# Patient Record
Sex: MALE | Race: WHITE | Employment: FULL TIME | URBAN - METROPOLITAN AREA
[De-identification: names, ages, dates, MRNs, and addresses within clinical notes are randomized per-mention and may not be internally consistent; named-entity substitution may affect disease eponyms.]

---

## 2018-02-16 ENCOUNTER — HOSPITAL ENCOUNTER (OUTPATIENT)
Dept: LAB | Age: 58
Discharge: HOME | End: 2018-02-16
Payer: COMMERCIAL

## 2018-02-16 ENCOUNTER — HOSPITAL ENCOUNTER (OUTPATIENT)
Dept: CT IMAGING | Age: 58
Discharge: HOME | End: 2018-02-18
Payer: COMMERCIAL

## 2018-02-16 DIAGNOSIS — N20.0 STONE, KIDNEY: ICD-10-CM

## 2018-02-16 DIAGNOSIS — R31.9 HEMATURIA SYNDROME: ICD-10-CM

## 2018-02-16 LAB
BILIRUBIN URINE: NEGATIVE
BLOOD, URINE: NEGATIVE
CLARITY: CLEAR
COLOR: YELLOW
CREAT SERPL-MCNC: 1.13 MG/DL (ref 0.7–1.2)
GFR AFRICAN AMERICAN: >60
GFR NON-AFRICAN AMERICAN: >60
GLUCOSE URINE: NEGATIVE MG/DL
KETONES, URINE: NEGATIVE MG/DL
LEUKOCYTE ESTERASE, URINE: NEGATIVE
NITRITE, URINE: NEGATIVE
PH UA: 7 (ref 5–9)
PROSTATE SPECIFIC ANTIGEN: 0.84 NG/ML (ref 0–3.89)
PROTEIN UA: NEGATIVE MG/DL
SPECIFIC GRAVITY UA: 1.05 (ref 1–1.03)
UROBILINOGEN, URINE: 0.2 E.U./DL

## 2018-02-16 PROCEDURE — 88112 CYTOPATH CELL ENHANCE TECH: CPT

## 2018-02-16 PROCEDURE — 74177 CT ABD & PELVIS W/CONTRAST: CPT

## 2018-02-16 PROCEDURE — 36415 COLL VENOUS BLD VENIPUNCTURE: CPT

## 2018-02-16 PROCEDURE — 82565 ASSAY OF CREATININE: CPT

## 2018-02-16 PROCEDURE — 87086 URINE CULTURE/COLONY COUNT: CPT

## 2018-02-16 PROCEDURE — 81003 URINALYSIS AUTO W/O SCOPE: CPT

## 2018-02-16 PROCEDURE — 6360000004 HC RX CONTRAST MEDICATION: Performed by: FAMILY MEDICINE

## 2018-02-16 PROCEDURE — 84153 ASSAY OF PSA TOTAL: CPT

## 2018-02-16 RX ADMIN — IOPAMIDOL 100 ML: 755 INJECTION, SOLUTION INTRAVENOUS at 08:40

## 2018-02-18 LAB — URINE CULTURE, ROUTINE: NORMAL

## 2023-03-13 ENCOUNTER — TELEMEDICINE (OUTPATIENT)
Dept: PRIMARY CARE | Facility: CLINIC | Age: 63
End: 2023-03-13
Payer: COMMERCIAL

## 2023-03-13 ENCOUNTER — TELEPHONE (OUTPATIENT)
Dept: PRIMARY CARE | Facility: CLINIC | Age: 63
End: 2023-03-13

## 2023-03-13 DIAGNOSIS — J01.10 ACUTE NON-RECURRENT FRONTAL SINUSITIS: Primary | ICD-10-CM

## 2023-03-13 PROCEDURE — 99214 OFFICE O/P EST MOD 30 MIN: CPT | Performed by: FAMILY MEDICINE

## 2023-03-13 RX ORDER — AZITHROMYCIN 250 MG/1
TABLET, FILM COATED ORAL
Qty: 6 TABLET | Refills: 0 | Status: SHIPPED | OUTPATIENT
Start: 2023-03-13

## 2023-03-13 NOTE — PROGRESS NOTES
Pt being seen virtually for sick visit.    Denies N/V/D/C, no S/V, denies rashes/lesions, no CP/SOB. Positive for fevers/chills.  Positive for frontal headache and periorbital pain and pressure.  Also sinus congestion.  All other systems were negative.    Gen: NAD  eyes: EOMI,   ENT: hearing grossly intact, no nasal discharge  resp: breathing comfortably, no SOB noted  derm: no rashes or lesions noted  neuro: CN II-XII grossly intact  psych: A&Ox3, mood pleasant, affect appropriate, recent and remote memory grossly intact.        Acute sinusitis.  Started about a week ago.  Having periorbital and frontal pain and pressure.  Taking Benadryl, ibuprofen, Robitussin, and loratadine. -->>  Continue your oral antihistamine/loratadine, strongly recommend guarding or nasal steroid spray, and a Z-Jaswinder. Could also take sudafed if needed.      return as already scheduled.

## 2023-03-21 ENCOUNTER — OFFICE VISIT (OUTPATIENT)
Dept: PRIMARY CARE | Facility: CLINIC | Age: 63
End: 2023-03-21
Payer: COMMERCIAL

## 2023-03-21 ENCOUNTER — APPOINTMENT (OUTPATIENT)
Dept: PRIMARY CARE | Facility: CLINIC | Age: 63
End: 2023-03-21
Payer: COMMERCIAL

## 2023-03-21 VITALS
DIASTOLIC BLOOD PRESSURE: 82 MMHG | OXYGEN SATURATION: 97 % | SYSTOLIC BLOOD PRESSURE: 113 MMHG | WEIGHT: 168 LBS | BODY MASS INDEX: 25.46 KG/M2 | HEIGHT: 68 IN | HEART RATE: 107 BPM

## 2023-03-21 DIAGNOSIS — J01.90 ACUTE SINUSITIS, RECURRENCE NOT SPECIFIED, UNSPECIFIED LOCATION: Primary | ICD-10-CM

## 2023-03-21 DIAGNOSIS — R05.1 ACUTE COUGH: ICD-10-CM

## 2023-03-21 PROCEDURE — 99214 OFFICE O/P EST MOD 30 MIN: CPT | Performed by: FAMILY MEDICINE

## 2023-03-21 RX ORDER — FENOFIBRATE 200 MG/1
1 CAPSULE ORAL DAILY
COMMUNITY
Start: 2018-08-09 | End: 2023-10-24

## 2023-03-21 RX ORDER — METHYLPREDNISOLONE ACETATE 80 MG/ML
80 INJECTION, SUSPENSION INTRA-ARTICULAR; INTRALESIONAL; INTRAMUSCULAR; SOFT TISSUE ONCE
Status: COMPLETED | OUTPATIENT
Start: 2023-03-21 | End: 2023-03-21

## 2023-03-21 RX ORDER — OMEPRAZOLE 40 MG/1
1 CAPSULE, DELAYED RELEASE ORAL DAILY
COMMUNITY
Start: 2018-08-09 | End: 2023-06-09

## 2023-03-21 RX ORDER — BENZONATATE 200 MG/1
200 CAPSULE ORAL 3 TIMES DAILY PRN
Qty: 21 CAPSULE | Refills: 1 | Status: SHIPPED | OUTPATIENT
Start: 2023-03-21 | End: 2023-04-20

## 2023-03-21 RX ORDER — ICOSAPENT ETHYL 1 G/1
CAPSULE ORAL
COMMUNITY
Start: 2020-11-11 | End: 2023-04-03

## 2023-03-21 RX ORDER — BENZONATATE 200 MG/1
200 CAPSULE ORAL 3 TIMES DAILY PRN
Qty: 21 CAPSULE | Refills: 1 | Status: SHIPPED | OUTPATIENT
Start: 2023-03-21 | End: 2023-03-21 | Stop reason: SDUPTHER

## 2023-03-21 RX ORDER — DOXYCYCLINE 100 MG/1
CAPSULE ORAL
Qty: 20 CAPSULE | Refills: 0 | Status: SHIPPED | OUTPATIENT
Start: 2023-03-21

## 2023-03-21 RX ORDER — POTASSIUM CITRATE 15 MEQ/1
TABLET, EXTENDED RELEASE ORAL
COMMUNITY
Start: 2022-12-29

## 2023-03-21 RX ORDER — ROSUVASTATIN CALCIUM 40 MG/1
1 TABLET, COATED ORAL DAILY
COMMUNITY
Start: 2022-11-25 | End: 2023-12-07 | Stop reason: SDUPTHER

## 2023-03-21 RX ADMIN — METHYLPREDNISOLONE ACETATE 80 MG: 80 INJECTION, SUSPENSION INTRA-ARTICULAR; INTRALESIONAL; INTRAMUSCULAR; SOFT TISSUE at 14:56

## 2023-03-21 NOTE — PROGRESS NOTES
Pt in today for cold/flu symptoms, feverish x 6 days.    Pt being seen virtually for sick visit.     Denies N/V/D/C, no S/V, denies rashes/lesions, no CP/SOB. Positive for fevers/chills.  Positive for frontal headache and periorbital pain and pressure.  Also sinus congestion.  All other systems were negative.     Gen: NAD  eyes: EOMI,   ENT: hearing grossly intact, no nasal discharge  resp: breathing comfortably, no SOB noted  derm: no rashes or lesions noted  neuro: CN II-XII grossly intact  psych: A&Ox3, mood pleasant, affect appropriate, recent and remote memory grossly intact.      acute viral syndrome with acute sinusitis, present chills, scratchy dry cough that gets pretty bad at times.  Was put on a Z-Jaswinder about a week ago and sinus symptoms have improved but still having fevers and chills.  Has been using oral antihistamines/loratadine.  Has nasal steroid spray but has not started using it yet. -->>  Will order chest x-ray.  Will prescribe doxycycline for 10 days.  We will also administer steroid injections.  We will also send in prescription for Tessalon Perles.    We will administer Kenalog injection today.  Return in summertime as already scheduled.

## 2023-04-03 DIAGNOSIS — E78.5 HYPERLIPIDEMIA, UNSPECIFIED HYPERLIPIDEMIA TYPE: Primary | ICD-10-CM

## 2023-04-03 RX ORDER — ICOSAPENT ETHYL 1000 MG/1
CAPSULE ORAL
Qty: 360 CAPSULE | Refills: 1 | Status: SHIPPED | OUTPATIENT
Start: 2023-04-03 | End: 2023-08-09

## 2023-04-27 ENCOUNTER — HOSPITAL ENCOUNTER (OUTPATIENT)
Dept: DATA CONVERSION | Facility: HOSPITAL | Age: 63
End: 2023-04-27
Attending: UROLOGY | Admitting: UROLOGY
Payer: COMMERCIAL

## 2023-04-27 DIAGNOSIS — E78.5 HYPERLIPIDEMIA, UNSPECIFIED: ICD-10-CM

## 2023-04-27 DIAGNOSIS — N35.919 UNSPECIFIED URETHRAL STRICTURE, MALE, UNSPECIFIED SITE: ICD-10-CM

## 2023-04-27 DIAGNOSIS — F17.200 NICOTINE DEPENDENCE, UNSPECIFIED, UNCOMPLICATED: ICD-10-CM

## 2023-04-27 DIAGNOSIS — N20.1 CALCULUS OF URETER: ICD-10-CM

## 2023-04-27 DIAGNOSIS — K21.9 GASTRO-ESOPHAGEAL REFLUX DISEASE WITHOUT ESOPHAGITIS: ICD-10-CM

## 2023-04-27 DIAGNOSIS — Z88.0 ALLERGY STATUS TO PENICILLIN: ICD-10-CM

## 2023-04-27 LAB
ABO GROUP (TYPE) IN BLOOD: NORMAL
ANTIBODY SCREEN: NORMAL
GRAM STAIN: NORMAL
INR IN PPP BY COAGULATION ASSAY: 1 (ref 0.9–1.1)
PROTHROMBIN TIME (PT) IN PPP BY COAGULATION ASSAY: 12 SEC (ref 9.8–13.4)
RH FACTOR: NORMAL
TISSUE/WOUND CULTURE/SMEAR: NORMAL

## 2023-05-02 LAB
CALCULI COMPOSITION: NORMAL
CALCULI DESCRIPTION: NORMAL
CALCULI MASS: 21 MG

## 2023-06-06 DIAGNOSIS — K21.9 GASTRO-ESOPHAGEAL REFLUX DISEASE WITHOUT ESOPHAGITIS: ICD-10-CM

## 2023-06-06 LAB
ALANINE AMINOTRANSFERASE (SGPT) (U/L) IN SER/PLAS: 21 U/L (ref 10–52)
ALBUMIN (G/DL) IN SER/PLAS: 4.7 G/DL (ref 3.4–5)
ALKALINE PHOSPHATASE (U/L) IN SER/PLAS: 75 U/L (ref 33–136)
ANION GAP IN SER/PLAS: 12 MMOL/L (ref 10–20)
APPEARANCE, URINE: CLEAR
ASPARTATE AMINOTRANSFERASE (SGOT) (U/L) IN SER/PLAS: 23 U/L (ref 9–39)
BASOPHILS (10*3/UL) IN BLOOD BY AUTOMATED COUNT: 0.07 X10E9/L (ref 0–0.1)
BASOPHILS/100 LEUKOCYTES IN BLOOD BY AUTOMATED COUNT: 1 % (ref 0–2)
BILIRUBIN TOTAL (MG/DL) IN SER/PLAS: 0.6 MG/DL (ref 0–1.2)
BILIRUBIN, URINE: NEGATIVE
BLOOD, URINE: NEGATIVE
CALCIDIOL (25 OH VITAMIN D3) (NG/ML) IN SER/PLAS: 27 NG/ML
CALCIUM (MG/DL) IN SER/PLAS: 11 MG/DL (ref 8.6–10.3)
CARBON DIOXIDE, TOTAL (MMOL/L) IN SER/PLAS: 27 MMOL/L (ref 21–32)
CHLORIDE (MMOL/L) IN SER/PLAS: 100 MMOL/L (ref 98–107)
CHOLESTEROL (MG/DL) IN SER/PLAS: 188 MG/DL (ref 0–199)
CHOLESTEROL IN HDL (MG/DL) IN SER/PLAS: 39.8 MG/DL
CHOLESTEROL/HDL RATIO: 4.7
COLOR, URINE: YELLOW
CREATININE (MG/DL) IN SER/PLAS: 1.13 MG/DL (ref 0.5–1.3)
EOSINOPHILS (10*3/UL) IN BLOOD BY AUTOMATED COUNT: 0.26 X10E9/L (ref 0–0.7)
EOSINOPHILS/100 LEUKOCYTES IN BLOOD BY AUTOMATED COUNT: 3.6 % (ref 0–6)
ERYTHROCYTE DISTRIBUTION WIDTH (RATIO) BY AUTOMATED COUNT: 15.3 % (ref 11.5–14.5)
ERYTHROCYTE MEAN CORPUSCULAR HEMOGLOBIN CONCENTRATION (G/DL) BY AUTOMATED: 33.3 G/DL (ref 32–36)
ERYTHROCYTE MEAN CORPUSCULAR VOLUME (FL) BY AUTOMATED COUNT: 93 FL (ref 80–100)
ERYTHROCYTES (10*6/UL) IN BLOOD BY AUTOMATED COUNT: 5.1 X10E12/L (ref 4.5–5.9)
ESTIMATED AVERAGE GLUCOSE FOR HBA1C: 117 MG/DL
GFR MALE: 73 ML/MIN/1.73M2
GLUCOSE (MG/DL) IN SER/PLAS: 107 MG/DL (ref 74–99)
GLUCOSE, URINE: NEGATIVE MG/DL
HEMATOCRIT (%) IN BLOOD BY AUTOMATED COUNT: 47.2 % (ref 41–52)
HEMOGLOBIN (G/DL) IN BLOOD: 15.7 G/DL (ref 13.5–17.5)
HEMOGLOBIN A1C/HEMOGLOBIN TOTAL IN BLOOD: 5.7 %
IMMATURE GRANULOCYTES/100 LEUKOCYTES IN BLOOD BY AUTOMATED COUNT: 0.1 % (ref 0–0.9)
KETONES, URINE: NEGATIVE MG/DL
LDL: 112 MG/DL (ref 0–99)
LEUKOCYTE ESTERASE, URINE: NEGATIVE
LEUKOCYTES (10*3/UL) IN BLOOD BY AUTOMATED COUNT: 7.2 X10E9/L (ref 4.4–11.3)
LYMPHOCYTES (10*3/UL) IN BLOOD BY AUTOMATED COUNT: 2.34 X10E9/L (ref 1.2–4.8)
LYMPHOCYTES/100 LEUKOCYTES IN BLOOD BY AUTOMATED COUNT: 32.6 % (ref 13–44)
MAGNESIUM (MG/DL) IN SER/PLAS: 1.68 MG/DL (ref 1.6–2.4)
MONOCYTES (10*3/UL) IN BLOOD BY AUTOMATED COUNT: 0.81 X10E9/L (ref 0.1–1)
MONOCYTES/100 LEUKOCYTES IN BLOOD BY AUTOMATED COUNT: 11.3 % (ref 2–10)
NEUTROPHILS (10*3/UL) IN BLOOD BY AUTOMATED COUNT: 3.69 X10E9/L (ref 1.2–7.7)
NEUTROPHILS/100 LEUKOCYTES IN BLOOD BY AUTOMATED COUNT: 51.4 % (ref 40–80)
NITRITE, URINE: NEGATIVE
PH, URINE: 8 (ref 5–8)
PHOSPHATE (MG/DL) IN SER/PLAS: 2.7 MG/DL (ref 2.5–4.9)
PLATELETS (10*3/UL) IN BLOOD AUTOMATED COUNT: 316 X10E9/L (ref 150–450)
POTASSIUM (MMOL/L) IN SER/PLAS: 4.2 MMOL/L (ref 3.5–5.3)
PROTEIN TOTAL: 7.8 G/DL (ref 6.4–8.2)
PROTEIN, URINE: NEGATIVE MG/DL
SODIUM (MMOL/L) IN SER/PLAS: 135 MMOL/L (ref 136–145)
SPECIFIC GRAVITY, URINE: 1.01 (ref 1–1.03)
THYROTROPIN (MIU/L) IN SER/PLAS BY DETECTION LIMIT <= 0.05 MIU/L: 0.73 MIU/L (ref 0.44–3.98)
TRIGLYCERIDE (MG/DL) IN SER/PLAS: 183 MG/DL (ref 0–149)
UREA NITROGEN (MG/DL) IN SER/PLAS: 17 MG/DL (ref 6–23)
UROBILINOGEN, URINE: <2 MG/DL (ref 0–1.9)
VLDL: 37 MG/DL (ref 0–40)

## 2023-06-09 RX ORDER — OMEPRAZOLE 40 MG/1
CAPSULE, DELAYED RELEASE ORAL
Qty: 90 CAPSULE | Refills: 1 | Status: SHIPPED | OUTPATIENT
Start: 2023-06-09 | End: 2023-09-12 | Stop reason: SDUPTHER

## 2023-07-17 ENCOUNTER — OFFICE VISIT (OUTPATIENT)
Dept: PRIMARY CARE | Facility: CLINIC | Age: 63
End: 2023-07-17
Payer: COMMERCIAL

## 2023-07-17 VITALS
WEIGHT: 165.5 LBS | HEART RATE: 87 BPM | OXYGEN SATURATION: 98 % | SYSTOLIC BLOOD PRESSURE: 159 MMHG | DIASTOLIC BLOOD PRESSURE: 95 MMHG | HEIGHT: 68 IN | BODY MASS INDEX: 25.08 KG/M2

## 2023-07-17 DIAGNOSIS — Z79.899 DRUG THERAPY: ICD-10-CM

## 2023-07-17 DIAGNOSIS — R73.03 PREDIABETES: ICD-10-CM

## 2023-07-17 DIAGNOSIS — B00.9 HERPES SIMPLEX: ICD-10-CM

## 2023-07-17 DIAGNOSIS — Z00.00 PREVENTATIVE HEALTH CARE: Primary | ICD-10-CM

## 2023-07-17 DIAGNOSIS — Z13.9 SCREENING FOR CONDITION: ICD-10-CM

## 2023-07-17 DIAGNOSIS — M81.0 OSTEOPOROSIS, UNSPECIFIED OSTEOPOROSIS TYPE, UNSPECIFIED PATHOLOGICAL FRACTURE PRESENCE: ICD-10-CM

## 2023-07-17 DIAGNOSIS — S22.000A COMPRESSION FRACTURE OF BODY OF THORACIC VERTEBRA (MULTI): ICD-10-CM

## 2023-07-17 DIAGNOSIS — E78.01 FAMILIAL HYPERCHOLESTEROLEMIA: ICD-10-CM

## 2023-07-17 DIAGNOSIS — E55.9 VITAMIN D DEFICIENCY: ICD-10-CM

## 2023-07-17 PROCEDURE — 4004F PT TOBACCO SCREEN RCVD TLK: CPT | Performed by: FAMILY MEDICINE

## 2023-07-17 PROCEDURE — 99396 PREV VISIT EST AGE 40-64: CPT | Performed by: FAMILY MEDICINE

## 2023-07-17 PROCEDURE — 99215 OFFICE O/P EST HI 40 MIN: CPT | Performed by: FAMILY MEDICINE

## 2023-07-17 NOTE — PROGRESS NOTES
Subjective   Patient ID: Usman Villarreal is a 63 y.o. male who presents for Routine FU / Lab Review (Pt in today for routine FU / lab review).    Review of Systems  Denies N/V/D/C, no HA/S/V, denies rashes/lesions, no CP/SOB. Denies fevers/chills.  Positive for back pain.  All other systems were negative.    Objective   Physical Exam  Gen: NAD  eyes: EOMI, PERRLA  ENT: hearing grossly intact, no nasal discharge  resp: CTABL, without R/R  heart: RRR without MRG  GI: abd: S/ND/NT, BS+  lymph: no axillary, cervical, supraclavicular lymphadenopathy noted   MS: gait grossly WNL,  derm: no rashes or lesions noted  neuro: CN II-XII grossly intact  psych: A&Ox3    Assessment/Plan   Diagnoses and all orders for this visit:  Preventative health care  -     CBC and Auto Differential; Future  -     Comprehensive Metabolic Panel; Future  -     TSH with reflex to Free T4 if abnormal; Future  -     Magnesium; Future  -     Lipid Panel; Future  -     Hemoglobin A1C; Future  -     Urinalysis with Reflex Microscopic; Future  -     Vitamin D 1,25 Dihydroxy; Future  Drug therapy  -     CBC and Auto Differential; Future  -     Comprehensive Metabolic Panel; Future  -     Magnesium; Future  -     Urinalysis with Reflex Microscopic; Future  Screening for condition  -     TSH with reflex to Free T4 if abnormal; Future  -     Lipid Panel; Future  -     Hemoglobin A1C; Future  Vitamin D deficiency  -     Vitamin D 1,25 Dihydroxy; Future  Familial hypercholesterolemia  -     Lipid Panel; Future  Herpes simplex  Osteoporosis, unspecified osteoporosis type, unspecified pathological fracture presence  Compression fracture of body of thoracic vertebra (CMS/HCC)  Prediabetes  -     Hemoglobin A1C; Future           Patient Discussion/Summary     annual wellness visit     -----  The next flu shot will be due next month.   Is up to date on shingles immunizations.   Up-to-date on COVID immunization series, but is due for bivalent booster.   Sounds like  patient is due for tetanus. -->> Recommend checking with your drugstore to get up-to-date on immunizations.      Screen for hepatitis C was negative november 2018.   Screening for colon cancer, prostate cancer -->> Sounds like most recent colonoscopy was around 2016, sounds like no polyps or other problem so probably due in 2026 for next. PSAs are being screened with urology.   -----    Cardiac CT calcium score was 213. -->> We will aggressively manage risk factors including blood pressure and cholesterol so that LDL is below 70 will be our goal.     BMI 25, down another 3 pounds since last appointment here.     New annual labs reviewed:  - Drug Therapy, -->> Plan to repeat annual labs around June 2024.   - Hyperlipidemia, familial hypercholesterolemia (had  in 2016), HDL 40, was 33, 34, 37, 36, 34, 40, 42, 45, goal is 45 or more. , was 88, 120, 120, 236, 136, 124, 130, 147, your goal is now 70 or less, so that is much improved but still a little too high. Patient is on Crestor 40 mg, fenofibrate 200 mg, Vascepa, and flush free niacin. We discussed possibly starting an injectable, for example Repatha, that once or twice a month. Patient declines at this time, wants to work on getting his back taken care of. -->>  We will discuss possible injections next time.  - Prediabetes, A1C 5.7, was 5.7, 5.4, 5.8, 5.5, 5.8, 5.6. Patient has improved diet. -->> Continue to watch your carbohydrate and sugar intake. We'll recheck with her next annual labs.  - Vitamin D deficiency, 27, was 34, 27, 30, 36, 30, 26, 28, 26, goal is 55 - 100. Patient is no longer on vitamin D, discontinued due to concern may be causing kidney stones. -->> Will recheck with next annual labs.  Definitely discussed vitamin D with endocrinology, because vitamin D conceivably could lower your calcium by putting more into your bones.    Osteoporosis, new diagnosis.  Having noted compression fractures T8 and 9, at different time intervals  apparently.  Has appointment scheduled to see endocrinology, Dr. Rodriguez at [facility]. -->> f/up as planned.     Herpes simplex, gets cold sores rarely, says the acyclovir works very well when he takes it at first symptom of a cold sore. -->> Refill acyclovir as needed.     Smokes about a pack a day. -->> Advised to quit.    Kidney stones, had passed another one a while ago.  Doing well at this time.  Seeing urology, had another lithotripsy recently.      - Patient will return around July 2024 for annual preventative visit and lab review.   - We will give patient lab slips to get our labs done with kidney labs next June.

## 2023-07-17 NOTE — PATIENT INSTRUCTIONS
Patient Discussion/Summary     annual wellness visit     -----  The next flu shot will be due next month.   Is up to date on shingles immunizations.   Up-to-date on COVID immunization series, but is due for bivalent booster.   Sounds like patient is due for tetanus. -->> Recommend checking with your drugstore to get up-to-date on immunizations.      Screen for hepatitis C was negative november 2018.   Screening for colon cancer, prostate cancer -->> Sounds like most recent colonoscopy was around 2016, sounds like no polyps or other problem so probably due in 2026 for next. PSAs are being screened with urology.   -----    Cardiac CT calcium score was 213. -->> We will aggressively manage risk factors including blood pressure and cholesterol so that LDL is below 70 will be our goal.     BMI 25, down another 3 pounds since last appointment here.     New annual labs reviewed:  - Drug Therapy, -->> Plan to repeat annual labs around June 2024.   - Hyperlipidemia, familial hypercholesterolemia (had  in 2016), HDL 40, was 33, 34, 37, 36, 34, 40, 42, 45, goal is 45 or more. , was 88, 120, 120, 236, 136, 124, 130, 147, your goal is now 70 or less, so that is much improved but still a little too high. Patient is on Crestor 40 mg, fenofibrate 200 mg, Vascepa, and flush free niacin. We discussed possibly starting an injectable, for example Repatha, that once or twice a month. Patient declines at this time, wants to work on getting his back taken care of. -->>  We will discuss possible injections next time.  - Prediabetes, A1C 5.7, was 5.7, 5.4, 5.8, 5.5, 5.8, 5.6. Patient has improved diet. -->> Continue to watch your carbohydrate and sugar intake. We'll recheck with her next annual labs.  - Vitamin D deficiency, 27, was 34, 27, 30, 36, 30, 26, 28, 26, goal is 55 - 100. Patient is no longer on vitamin D, discontinued due to concern may be causing kidney stones. -->> Will recheck with next annual labs.  Definitely  discussed vitamin D with endocrinology, because vitamin D conceivably could lower your calcium by putting more into your bones.    Osteoporosis, new diagnosis.  Having noted compression fractures T8 and 9, at different time intervals apparently.  Has appointment scheduled to see endocrinology, Dr. Rodriguez at [facility]. -->> f/up as planned.     Herpes simplex, gets cold sores rarely, says the acyclovir works very well when he takes it at first symptom of a cold sore. -->> Refill acyclovir as needed.     Smokes about a pack a day. -->> Advised to quit.    Kidney stones, had passed another one a while ago.  Doing well at this time.  Seeing urology, had another lithotripsy recently.      - Patient will return around July 2024 for annual preventative visit and lab review.   - We will give patient lab slips to get our labs done with kidney labs next June.

## 2023-08-07 DIAGNOSIS — E78.5 HYPERLIPIDEMIA, UNSPECIFIED HYPERLIPIDEMIA TYPE: ICD-10-CM

## 2023-08-09 RX ORDER — ICOSAPENT ETHYL 1000 MG/1
CAPSULE ORAL
Qty: 360 CAPSULE | Refills: 1 | Status: SHIPPED | OUTPATIENT
Start: 2023-08-09 | End: 2024-05-08 | Stop reason: SDUPTHER

## 2023-09-07 VITALS — WEIGHT: 167.55 LBS | BODY MASS INDEX: 25.39 KG/M2 | HEIGHT: 68 IN

## 2023-09-12 DIAGNOSIS — K21.9 GASTRO-ESOPHAGEAL REFLUX DISEASE WITHOUT ESOPHAGITIS: ICD-10-CM

## 2023-09-12 LAB
ALBUMIN (G/DL) IN SER/PLAS: 4.7 G/DL (ref 3.4–5)
ANION GAP IN SER/PLAS: 15 MMOL/L (ref 10–20)
CALCIDIOL (25 OH VITAMIN D3) (NG/ML) IN SER/PLAS: 28 NG/ML
CALCIUM (MG/DL) IN SER/PLAS: 10.8 MG/DL (ref 8.6–10.3)
CARBON DIOXIDE, TOTAL (MMOL/L) IN SER/PLAS: 27 MMOL/L (ref 21–32)
CHLORIDE (MMOL/L) IN SER/PLAS: 102 MMOL/L (ref 98–107)
CREATININE (MG/DL) IN SER/PLAS: 1.2 MG/DL (ref 0.5–1.3)
GFR MALE: 68 ML/MIN/1.73M2
GLUCOSE (MG/DL) IN SER/PLAS: 99 MG/DL (ref 74–99)
MAGNESIUM (MG/DL) IN SER/PLAS: 1.75 MG/DL (ref 1.6–2.4)
PARATHYRIN INTACT (PG/ML) IN SER/PLAS: 34.5 PG/ML (ref 18.5–88)
PHOSPHATE (MG/DL) IN SER/PLAS: 2.9 MG/DL (ref 2.5–4.9)
POTASSIUM (MMOL/L) IN SER/PLAS: 4.5 MMOL/L (ref 3.5–5.3)
SODIUM (MMOL/L) IN SER/PLAS: 139 MMOL/L (ref 136–145)
THYROTROPIN (MIU/L) IN SER/PLAS BY DETECTION LIMIT <= 0.05 MIU/L: 1.03 MIU/L (ref 0.44–3.98)
UREA NITROGEN (MG/DL) IN SER/PLAS: 19 MG/DL (ref 6–23)

## 2023-09-12 RX ORDER — OMEPRAZOLE 40 MG/1
40 CAPSULE, DELAYED RELEASE ORAL DAILY
Qty: 90 CAPSULE | Refills: 3 | Status: SHIPPED | OUTPATIENT
Start: 2023-09-12

## 2023-09-14 NOTE — H&P
History & Physical Reviewed:   I have reviewed the History and Physical dated:  24-Apr-2023   History and Physical reviewed and relevant findings noted. Patient examined to review pertinent physical  findings.: No significant changes   Home Medications Reviewed: no changes noted   Allergies Reviewed: no changes noted     Consent:   COVID-19 Consent:  ·  COVID-19 Risk Consent Surgeon has reviewed key risks related to the risk of kaushal COVID-19 and if they contract COVID-19 what the risks are.       Electronic Signatures:  Khanh Hogan)  (Signed 27-Apr-2023 13:12)   Authored: History & Physical Reviewed, Consent, Note  Completion      Last Updated: 27-Apr-2023 13:12 by Khanh Hogan)

## 2023-09-15 LAB — SEX HORMONE BINDING GLOBULIN (NMOL/L) IN SER/PLAS: 45.8 NMOL/L (ref 13.3–89.5)

## 2023-09-18 LAB
TESTOSTERONE FREE (CHAN): 70 PG/ML (ref 35–155)
TESTOSTERONE,TOTAL,LC-MS/MS: 486 NG/DL (ref 250–1100)

## 2023-10-02 NOTE — OP NOTE
Post Operative Note:     PreOp Diagnosis: Urethral stricture, ureteral calculus,  hydronephrosis, flank pain April 27, 2023   Post-Procedure Diagnosis: Same   Procedure: 1.  Cystoscopy with urethral dilatation  2.  Right retropyelogram  3.  Right ureteroscopy with removal of 4 separate 4 mm proximal ureteral calculi  4.   5.   Surgeon: Dr. Khanh Hogan   Resident/Fellow/Other Assistant: None   Anesthesia: General/Dr. Cornelio Pardo   I.V. Fluids: 1000 cc   Estimated Blood Loss (mL): Minimal   Blood Replacement: None   Specimen: yes. Right ureteral calculi   Complications: None   Findings: 4 separate right proximal ureteral calculi  measuring 4 mm each   Patient Returned To/Condition: PACU/stable   Urine Output: Lost to outflow   Drains and/or Catheters: None   Implants: None     Operative Report Dictated:  Dictation: not applicable - note contains Operative  Report   Note Recipients: Jamie Waller MD - [phone]  []  Khanh Hogan MD - [phone] [preferred]   Operative Report:    INDICATIONS FOR SURGERY:  63-year-old white male with a long history of stone disease.  Now with a 4 mm right proximal stone and a 5 mm right UVJ stone.  He has been to the emergency room on 2 separate occasions recently.  210 CAT scans have been identified related to those stones.   Benefits, risks, potential complications, and all therapeutic options have been described to the patient. All questions have been answered satisfactorily and the patient has elected to undergo the above named procedure.    DETAILS OF OPERATION:  The patient was brought to the operating room suite and identified by wrist band. The patient received ciprofloxacin 400 mg intravenously as a prophylactic antibiotic. The patient underwent general anesthesia under the direction of Dr. Cornelio Pardo. Once  adequate anesthesia was achieved, the patient was placed in the dorsolithotomy position. A timeout was performed in accordance with the Joint  Commission. This procedure was performed with the use of videoscopic vision and x-ray.    The #22 Qatari rigid cystoscope could not be inserted due to a severe distal urethral stricture within 2 cm of the meatus.  Therefore, with the use of the male sounds, the stricture was dilated to 26 Qatari with bleeding.  The cystoscope was then inserted.   Once inside the bladder, both ureteral orifices were identified.  A full examination did not identify any evidence of stones, lesions or tumors.    A 6 Qatari open-ended ureteral catheter was cannulated into the right ureteral orifice and a retropyelogram was performed.  This identified filling defects in the proximal ureter.  The cystoscope and ureteral catheter were removed.    The 8 Qatari semirigid ureteroscope was then introduced.  With the use of a 0.035 inch guidewire, the right ureter was cannulated.  A total of 4 separate 4 mm stones were encountered in the right proximal ureter.  Guidewire was advanced beyond the stones  to the renal pelvis.  The ureteroscope was removed.  Ureteroscope was reinserted alongside the guidewire.  With the use of the grasping forceps, each of the stones was engaged separately and removed separately.  They were sent collectively for metabolic  analysis.  The ureteroscope was then reintroduced and the entire length of the ureter to the renal pelvis was visualized.  No further stones or fragments were seen.  The ureteroscope and guidewire were removed.    The patient tolerated the procedure well and was transferred to the post anesthesia care unit alert, awake, in good condition. We will await metabolic analysis of the stones and postoperative imaging studies.      Khanh Hogan M.D.    *** This report was created with the use of a voice recognition software system. Unintended typographical or grammatical errors may have occurred. ***      Attestation:   Note Completion:  Attending Attestation I performed the procedure without a  resident         Electronic Signatures:  Khanh Hogan)  (Signed 30-Apr-2023 20:44)   Authored: Post Operative Note, Note Completion      Last Updated: 30-Apr-2023 20:44 by Khanh Hogan)

## 2023-10-21 DIAGNOSIS — R07.81 PLEURODYNIA: ICD-10-CM

## 2023-10-24 RX ORDER — FENOFIBRATE 200 MG/1
200 CAPSULE ORAL DAILY
Qty: 90 CAPSULE | Refills: 3 | Status: SHIPPED | OUTPATIENT
Start: 2023-10-24

## 2023-12-07 DIAGNOSIS — E78.01 FAMILIAL HYPERCHOLESTEROLEMIA: Primary | ICD-10-CM

## 2023-12-07 RX ORDER — ROSUVASTATIN CALCIUM 40 MG/1
TABLET, COATED ORAL
Qty: 90 TABLET | Refills: 3 | Status: SHIPPED | OUTPATIENT
Start: 2023-12-07

## 2023-12-07 RX ORDER — ACYCLOVIR 400 MG/1
400 TABLET ORAL 3 TIMES DAILY PRN
COMMUNITY
End: 2023-12-08

## 2023-12-08 DIAGNOSIS — B00.9 HERPES SIMPLEX: Primary | ICD-10-CM

## 2023-12-08 RX ORDER — ACYCLOVIR 400 MG/1
TABLET ORAL
Qty: 30 TABLET | Refills: 5 | Status: SHIPPED | OUTPATIENT
Start: 2023-12-08

## 2023-12-09 DIAGNOSIS — E78.01 FAMILIAL HYPERCHOLESTEROLEMIA: ICD-10-CM

## 2023-12-11 RX ORDER — ROSUVASTATIN CALCIUM 40 MG/1
TABLET, COATED ORAL
Qty: 90 TABLET | Refills: 3 | OUTPATIENT
Start: 2023-12-11

## 2024-01-23 ENCOUNTER — LAB (OUTPATIENT)
Dept: LAB | Facility: LAB | Age: 64
End: 2024-01-23
Payer: COMMERCIAL

## 2024-01-23 DIAGNOSIS — R31.9 HEMATURIA, UNSPECIFIED: ICD-10-CM

## 2024-01-23 DIAGNOSIS — E83.52 HYPERCALCEMIA: ICD-10-CM

## 2024-01-23 DIAGNOSIS — M81.0 AGE-RELATED OSTEOPOROSIS WITHOUT CURRENT PATHOLOGICAL FRACTURE: ICD-10-CM

## 2024-01-23 DIAGNOSIS — R82.994 HYPERCALCIURIA: ICD-10-CM

## 2024-01-23 DIAGNOSIS — N20.1 CALCULUS OF URETER: ICD-10-CM

## 2024-01-23 DIAGNOSIS — E55.9 VITAMIN D DEFICIENCY, UNSPECIFIED: ICD-10-CM

## 2024-01-23 LAB
25(OH)D3 SERPL-MCNC: 26 NG/ML (ref 30–100)
ALBUMIN SERPL BCP-MCNC: 4.9 G/DL (ref 3.4–5)
ANION GAP SERPL CALC-SCNC: 15 MMOL/L (ref 10–20)
APPEARANCE UR: CLEAR
BILIRUB UR STRIP.AUTO-MCNC: NEGATIVE MG/DL
BUN SERPL-MCNC: 17 MG/DL (ref 6–23)
CALCIUM SERPL-MCNC: 10.6 MG/DL (ref 8.6–10.3)
CHLORIDE SERPL-SCNC: 100 MMOL/L (ref 98–107)
CO2 SERPL-SCNC: 27 MMOL/L (ref 21–32)
COLOR UR: NORMAL
CREAT SERPL-MCNC: 1.21 MG/DL (ref 0.5–1.3)
EGFRCR SERPLBLD CKD-EPI 2021: 68 ML/MIN/1.73M*2
GLUCOSE SERPL-MCNC: 88 MG/DL (ref 74–99)
GLUCOSE UR STRIP.AUTO-MCNC: NEGATIVE MG/DL
KETONES UR STRIP.AUTO-MCNC: NEGATIVE MG/DL
LEUKOCYTE ESTERASE UR QL STRIP.AUTO: NEGATIVE
MAGNESIUM SERPL-MCNC: 1.9 MG/DL (ref 1.6–2.4)
NITRITE UR QL STRIP.AUTO: NEGATIVE
PH UR STRIP.AUTO: 8 [PH]
PHOSPHATE SERPL-MCNC: 2.3 MG/DL (ref 2.5–4.9)
POTASSIUM SERPL-SCNC: 5.1 MMOL/L (ref 3.5–5.3)
PROT UR STRIP.AUTO-MCNC: NEGATIVE MG/DL
PSA SERPL-MCNC: 1.88 NG/ML
PTH-INTACT SERPL-MCNC: 36.8 PG/ML (ref 18.5–88)
RBC # UR STRIP.AUTO: NEGATIVE /UL
SODIUM SERPL-SCNC: 137 MMOL/L (ref 136–145)
SP GR UR STRIP.AUTO: 1
TSH SERPL-ACNC: 1 MIU/L (ref 0.44–3.98)
UROBILINOGEN UR STRIP.AUTO-MCNC: <2 MG/DL

## 2024-01-23 PROCEDURE — 84153 ASSAY OF PSA TOTAL: CPT

## 2024-01-23 PROCEDURE — 83970 ASSAY OF PARATHORMONE: CPT

## 2024-01-23 PROCEDURE — 84443 ASSAY THYROID STIM HORMONE: CPT

## 2024-01-23 PROCEDURE — 83735 ASSAY OF MAGNESIUM: CPT

## 2024-01-23 PROCEDURE — 80069 RENAL FUNCTION PANEL: CPT

## 2024-01-23 PROCEDURE — 36415 COLL VENOUS BLD VENIPUNCTURE: CPT

## 2024-01-23 PROCEDURE — 82306 VITAMIN D 25 HYDROXY: CPT

## 2024-01-23 PROCEDURE — 87086 URINE CULTURE/COLONY COUNT: CPT

## 2024-01-23 PROCEDURE — 81003 URINALYSIS AUTO W/O SCOPE: CPT

## 2024-01-24 LAB — BACTERIA UR CULT: NO GROWTH

## 2024-03-04 ENCOUNTER — OFFICE VISIT (OUTPATIENT)
Dept: UROLOGY | Facility: CLINIC | Age: 64
End: 2024-03-04
Payer: COMMERCIAL

## 2024-03-04 VITALS
WEIGHT: 169.31 LBS | DIASTOLIC BLOOD PRESSURE: 95 MMHG | HEIGHT: 68 IN | RESPIRATION RATE: 16 BRPM | BODY MASS INDEX: 25.66 KG/M2 | HEART RATE: 101 BPM | SYSTOLIC BLOOD PRESSURE: 144 MMHG

## 2024-03-04 DIAGNOSIS — N20.0 NEPHROLITHIASIS: Primary | ICD-10-CM

## 2024-03-04 DIAGNOSIS — R82.89 ABNORMAL URINE CYTOLOGY: ICD-10-CM

## 2024-03-04 DIAGNOSIS — N20.0 NEPHROLITHIASIS: ICD-10-CM

## 2024-03-04 PROCEDURE — 99214 OFFICE O/P EST MOD 30 MIN: CPT | Performed by: UROLOGY

## 2024-03-04 ASSESSMENT — ENCOUNTER SYMPTOMS
DIFFICULTY URINATING: 0
DYSURIA: 0
FREQUENCY: 0
HEMATURIA: 0

## 2024-03-04 ASSESSMENT — PAIN SCALES - GENERAL: PAINLEVEL: 2

## 2024-03-04 NOTE — PROGRESS NOTES
Patient has lower back pain. Patient had lithotripsy with Dr. Hogan on 4/27/23. Patient denies any concerns about falling or safety. Patient has no new urinary issues, has nocturia x2 but not a concern at the moment. CV     Review of Systems   Genitourinary:  Negative for decreased urine volume, difficulty urinating, dysuria, enuresis, frequency, hematuria and urgency.   All other systems reviewed and are negative.

## 2024-03-04 NOTE — LETTER
"March 4, 2024     Jamie Waller DO  [address] [facility], [address]  [facility] [address]    Patient: Usman Villarreal   YOB: 1960   Date of Visit: 3/4/2024       Dear Dr. Jamie Waller DO:    Thank you for referring Usman Villarreal to me for evaluation. Below are my notes for this consultation.  If you have questions, please do not hesitate to call me. I look forward to following your patient along with you.       Sincerely,     Khanh Hogan MD      CC: No Recipients  ______________________________________________________________________________________        Provider Impressions     64 year-old white male referred by Dr. Waller for BACK PAIN, HEMATURIA, bilateral KIDNEY STONES. Patient is a  by occupation. He has a positive family history of breast cancer. He has a 40-pack-year cigarette smoking history and now smokes one pack of cigarettes per day.     04/14/18, urinalysis, urine culture and urine cytology are negative. PSA 0.84. Several CAT scans and a CT urogram demonstrated an 8 mm left RENAL CALCULUS and a 4 mm right RENAL CALCULUS. Benefits benefits and risks described. Patient will undergo ESWL of his left RENAL CALCULUS on 05/17/18. The CAT scans have shown CHRONIC PANCREATITIS also. He has seen Dr. Andrea in consultation.     05/17/18, OR, CYSTOSCOPY, left RETROPYELOGRAM, placement of left 6 English by 26 cm double-J URETERAL STENT, ESWL of a 0.7 cm left lower pole STONE.     05/19/18, renal colic CAT scan identifies \"small\" left STONES no sizes were given.     05/23/18, KUB reveals tiny left RENAL STONE remaining.     05/26/18, in office CYSTOSCOPY with REMOVAL of left URETERAL STENT     03/12/19, patient returns with no complaints. Urine culture is normal with no growth. Parathyroid hormone normal at 40.9. PSA normal at 1.4. Urinalysis is negative. Creatinine 1.26. 24-hour urine shows HIGH OXALATE. Renal colic CAT scan does show a " 5 mm STONE in the right kidney. URINE CYTOLOGY IS ATYPICAL WITH PAPILLARY CLUSTERS. He does have a significant cigarette smoking history. I offered the patient a cystoscopy under local anesthesia for evaluation of his abnormal cytology. He ABSOLUTELY REFUSES ANY LOCAL PROCEDURES. He also ABSOLUTELY REFUSES ANY URETERAL STENTS. Therefore, he will return in 1 month with an IVP to determine whether to proceed with ESWL. If he does have a large stone, we would perform ESWL, cystoscopy and possible TURBT. If his stone is less than 5 mm, then he will undergo a cystoscopy with possible TURBT in the operating room.     04/26/19, patient continues to have no complaints. IVP was performed which identified a 4 mm lower pole right RENAL CALCULUS which we will continue to observe. However with attention to his ABNORMAL CYTOLOGY, the patient is now willing to undergo a cystoscopy in the office under local anesthesia.     05/01/19, cystoscopy today does not reveal any evidence of tumors or stones within the bladder. The patient will return in March of next year for his yearly evaluation.     03/06/20, patient arrives alone. He has no new complaints. Urinalysis and urine culture negative. Urine cytology once again shows atypical cells and cannot rule out urothelial neoplasm. We will proceed with a cystoscopy. This is a second year and a row in this 40-pack-year cigarette smoker and pack-a-day smoker. If the cystoscopy is negative and another abnormal cytology occurs, I will send him to a different urologist for further evaluation. Renal ultrasound shows no stones bilaterally. PSA is normal at 1.00. He will return for cystoscopy     06/16/20, cystoscopy today does not reveal any evidence of stones, lesions or tumors. In light of 2 questionable urine cytologies and a daily cigarette smoker, I will repeat the cytology in 3 months. If it is still suspicious for urothelial neoplasm, I will refer him to Dr. Jenkins for further  "evaluation and treatment as necessary. He would most probably require ureteroscopy at that point.     09/04/20, patient arrives alone. His repeat urine cytology is negative for malignant cells. We will continue to follow. He will return in March.     February 22, 2021, patient arrives alone. He is extremely anxious. A renal ultrasound showed bilateral 8 mm stones, the one on the right side was in the UPJ. This was confirmed with a CT urogram showing a 6 mm stone just distal to the right UPJ. No left stone was seen. Urine tests were all normal including urinalysis, urine culture and cytology. PSA is normal at 1.50 creatinine 1.30. Patient has absolutely no pain at this time. I have scheduled him for ESWL of his right proximal ureteral calculus. He absolutely refuses ureteral stents due to his last experience. He has been reading extensively online and wishes to have ureteroscopy with laser lithotripsy performed by Dr. Lay. I have absolutely no problem canceling his surgery and scheduling him with Dr. Lay which we will do. He may choose to have all follow-up with Dr. Lay or return to me next year. Which ever is in his best interest. He is very very anxious about having ESWL again and experiencing discomfort.     March 9, 2021 OR, Dr. Lay, ESWL and right retrograde, ureteroscopy and basket extraction of stones.     October 6, 2021, Dr. Lay, office, follow-up visit.     March 15, 2022, patient arrives alone. He is extremely anxious and worried. Urinalysis is negative for blood. Urine culture no growth. Urine cytology again is atypical cannot rule out neoplasm. I have offered him a cystoscopy in the office. PSA is normal at 1.50. Renal ultrasound shows only a 4 mm stone in the right kidney. His last experience with me and with Dr. Lay with the ureteral stent was \"the worst experience of my life\". He is petrified that the for 4 mm stone will grow and that he will need a procedure with a stent. He is " "strongly asking for ESWL of a 4 mm stone without a stent and at the same time evaluating his bladder for his abnormal cytology. Before I would proceed, I would like a renal colic CAT scan and KUB with upright. He will return in 4 weeks.     April 11, 2022, patient arrives alone. Renal colic CAT scan identifies a 3 mm stone at the right UPJ. Also sigmoid colon thickening recommending a colonoscopy. We will contact Dr. Ramírez Smallwood for evaluation, gastroenterologist. We will schedule the patient for ESWL of the stone and also possible TURBT due to his abnormal cytology. He is extremely anxious. He is scheduled for May 12, 2022 and Dr. Jamie Perdue will provide preoperative medical risk stratification.     May 12, 2022, OR, cystoscopy with urethral dilatation, right ureteroscopy, ESWL of a 4 mm right UPJ stone.     May 18, 2023, postoperative KUB, no stone seen.     March 7, 2023, patient arrives alone. He is now on complete disability. He is extremely anxious about all of his medical conditions. He no longer has any flank pain. PSA is normal at 1.54. Urinalysis is negative for blood. Urine culture shows no growth. Urine cytology is lacking atypia. Renal ultrasound shows an 8 mm simple right renal cyst and a \"punctate\" stone in the left renal sinus fat. He will return in 1 year.    April 27, 2023, OR, cystoscopy with urethral dilatation, right retropyelogram, right ureteroscopy with removal of 4 separate 4 mm proximal ureteral calculi    CALCIUM PHOSPHATE stones    May 3, 2023, postoperative KUB, no stone seen.    March 4, 2024, BIRTHDAY BOY.  Patient arrives alone.  He still has issues with back pain.  He is scheduled for parathyroid surgery next month.  Urine culture no growth.  Urinalysis is negative for blood.  PSA 1.88.  Renal ultrasound shows 2 mm stones in the right kidney and 3 mm stones in the left kidney.  We will continue to follow.  He will return in 1 year.     PLAN:     #1 in March 2025 with urine " studies, PSA, and a renal ultrasound .     #2 patient had a horrible urgency and frequency with his stent. Also distal penile pain. He absolutely REFUSES a URETERAL STENT    Physical Exam  Vitals and nursing note reviewed.   Constitutional:       Appearance: Normal appearance.   HENT:      Head: Normocephalic and atraumatic.   Pulmonary:      Effort: Pulmonary effort is normal.   Abdominal:      Palpations: Abdomen is soft.   Musculoskeletal:         General: Normal range of motion.      Cervical back: Normal range of motion and neck supple.   Neurological:      Mental Status: He is alert and oriented to person, place, and time. Mental status is at baseline.   Psychiatric:         Thought Content: Thought content normal.         This note was created with voice-recognition software and was not corrected for typographical or grammatical errors.

## 2024-03-04 NOTE — PATIENT INSTRUCTIONS
Patient Discussion/Summary     It was nice to see you once again. I am happy to hear that you are scheduled for parathyroid surgery next month. Your urinalysis shows no blood, urine culture no growth. Your PSA is normal at 1.88. Your ultrasound shows an 2 mm stones in the right kidney and 3 mm stones in the left kidney. No treatment required. I will see you again in 1 year.  HAPPY BIRTHDAY.      This note was created with voice-recognition software and was not corrected for typographical or grammatical errors.

## 2024-03-04 NOTE — PROGRESS NOTES
"    Provider Impressions     64 year-old white male referred by Dr. Perdue for BACK PAIN, HEMATURIA, bilateral KIDNEY STONES. Patient is a  by occupation. He has a positive family history of breast cancer. He has a 40-pack-year cigarette smoking history and now smokes one pack of cigarettes per day.     04/14/18, urinalysis, urine culture and urine cytology are negative. PSA 0.84. Several CAT scans and a CT urogram demonstrated an 8 mm left RENAL CALCULUS and a 4 mm right RENAL CALCULUS. Benefits benefits and risks described. Patient will undergo ESWL of his left RENAL CALCULUS on 05/17/18. The CAT scans have shown CHRONIC PANCREATITIS also. He has seen Dr. Andrea in consultation.     05/17/18, OR, CYSTOSCOPY, left RETROPYELOGRAM, placement of left 6 Amharic by 26 cm double-J URETERAL STENT, ESWL of a 0.7 cm left lower pole STONE.     05/19/18, renal colic CAT scan identifies \"small\" left STONES no sizes were given.     05/23/18, KUB reveals tiny left RENAL STONE remaining.     05/26/18, in office CYSTOSCOPY with REMOVAL of left URETERAL STENT     03/12/19, patient returns with no complaints. Urine culture is normal with no growth. Parathyroid hormone normal at 40.9. PSA normal at 1.4. Urinalysis is negative. Creatinine 1.26. 24-hour urine shows HIGH OXALATE. Renal colic CAT scan does show a 5 mm STONE in the right kidney. URINE CYTOLOGY IS ATYPICAL WITH PAPILLARY CLUSTERS. He does have a significant cigarette smoking history. I offered the patient a cystoscopy under local anesthesia for evaluation of his abnormal cytology. He ABSOLUTELY REFUSES ANY LOCAL PROCEDURES. He also ABSOLUTELY REFUSES ANY URETERAL STENTS. Therefore, he will return in 1 month with an IVP to determine whether to proceed with ESWL. If he does have a large stone, we would perform ESWL, cystoscopy and possible TURBT. If his stone is less than 5 mm, then he will undergo a cystoscopy with possible TURBT in the operating room.   "   04/26/19, patient continues to have no complaints. IVP was performed which identified a 4 mm lower pole right RENAL CALCULUS which we will continue to observe. However with attention to his ABNORMAL CYTOLOGY, the patient is now willing to undergo a cystoscopy in the office under local anesthesia.     05/01/19, cystoscopy today does not reveal any evidence of tumors or stones within the bladder. The patient will return in March of next year for his yearly evaluation.     03/06/20, patient arrives alone. He has no new complaints. Urinalysis and urine culture negative. Urine cytology once again shows atypical cells and cannot rule out urothelial neoplasm. We will proceed with a cystoscopy. This is a second year and a row in this 40-pack-year cigarette smoker and pack-a-day smoker. If the cystoscopy is negative and another abnormal cytology occurs, I will send him to a different urologist for further evaluation. Renal ultrasound shows no stones bilaterally. PSA is normal at 1.00. He will return for cystoscopy     06/16/20, cystoscopy today does not reveal any evidence of stones, lesions or tumors. In light of 2 questionable urine cytologies and a daily cigarette smoker, I will repeat the cytology in 3 months. If it is still suspicious for urothelial neoplasm, I will refer him to Dr. Jenkins for further evaluation and treatment as necessary. He would most probably require ureteroscopy at that point.     09/04/20, patient arrives alone. His repeat urine cytology is negative for malignant cells. We will continue to follow. He will return in March.     February 22, 2021, patient arrives alone. He is extremely anxious. A renal ultrasound showed bilateral 8 mm stones, the one on the right side was in the UPJ. This was confirmed with a CT urogram showing a 6 mm stone just distal to the right UPJ. No left stone was seen. Urine tests were all normal including urinalysis, urine culture and cytology. PSA is normal at 1.50  "creatinine 1.30. Patient has absolutely no pain at this time. I have scheduled him for ESWL of his right proximal ureteral calculus. He absolutely refuses ureteral stents due to his last experience. He has been reading extensively online and wishes to have ureteroscopy with laser lithotripsy performed by Dr. Lay. I have absolutely no problem canceling his surgery and scheduling him with Dr. Lay which we will do. He may choose to have all follow-up with Dr. Lay or return to me next year. Which ever is in his best interest. He is very very anxious about having ESWL again and experiencing discomfort.     March 9, 2021 OR, Dr. Lay, ESWL and right retrograde, ureteroscopy and basket extraction of stones.     October 6, 2021, Dr. Lay, office, follow-up visit.     March 15, 2022, patient arrives alone. He is extremely anxious and worried. Urinalysis is negative for blood. Urine culture no growth. Urine cytology again is atypical cannot rule out neoplasm. I have offered him a cystoscopy in the office. PSA is normal at 1.50. Renal ultrasound shows only a 4 mm stone in the right kidney. His last experience with me and with Dr. Lay with the ureteral stent was \"the worst experience of my life\". He is petrified that the for 4 mm stone will grow and that he will need a procedure with a stent. He is strongly asking for ESWL of a 4 mm stone without a stent and at the same time evaluating his bladder for his abnormal cytology. Before I would proceed, I would like a renal colic CAT scan and KUB with upright. He will return in 4 weeks.     April 11, 2022, patient arrives alone. Renal colic CAT scan identifies a 3 mm stone at the right UPJ. Also sigmoid colon thickening recommending a colonoscopy. We will contact Dr. Ramírez Smallwood for evaluation, gastroenterologist. We will schedule the patient for ESWL of the stone and also possible TURBT due to his abnormal cytology. He is extremely anxious. He is scheduled for May " "12, 2022 and Dr. Jamie Perdue will provide preoperative medical risk stratification.     May 12, 2022, OR, cystoscopy with urethral dilatation, right ureteroscopy, ESWL of a 4 mm right UPJ stone.     May 18, 2023, postoperative KUB, no stone seen.     March 7, 2023, patient arrives alone. He is now on complete disability. He is extremely anxious about all of his medical conditions. He no longer has any flank pain. PSA is normal at 1.54. Urinalysis is negative for blood. Urine culture shows no growth. Urine cytology is lacking atypia. Renal ultrasound shows an 8 mm simple right renal cyst and a \"punctate\" stone in the left renal sinus fat. He will return in 1 year.    April 27, 2023, OR, cystoscopy with urethral dilatation, right retropyelogram, right ureteroscopy with removal of 4 separate 4 mm proximal ureteral calculi    CALCIUM PHOSPHATE stones    May 3, 2023, postoperative KUB, no stone seen.    March 4, 2024, BIRTHDAY BOY.  Patient arrives alone.  He still has issues with back pain.  He is scheduled for parathyroid surgery next month.  Urine culture no growth.  Urinalysis is negative for blood.  PSA 1.88.  Renal ultrasound shows 2 mm stones in the right kidney and 3 mm stones in the left kidney.  We will continue to follow.  He will return in 1 year.     PLAN:     #1 in March 2025 with urine studies, PSA, and a renal ultrasound .     #2 patient had a horrible urgency and frequency with his stent. Also distal penile pain. He absolutely REFUSES a URETERAL STENT    Physical Exam  Vitals and nursing note reviewed.   Constitutional:       Appearance: Normal appearance.   HENT:      Head: Normocephalic and atraumatic.   Pulmonary:      Effort: Pulmonary effort is normal.   Abdominal:      Palpations: Abdomen is soft.   Musculoskeletal:         General: Normal range of motion.      Cervical back: Normal range of motion and neck supple.   Neurological:      Mental Status: He is alert and oriented to person, place, " and time. Mental status is at baseline.   Psychiatric:         Thought Content: Thought content normal.         This note was created with voice-recognition software and was not corrected for typographical or grammatical errors.

## 2024-05-07 ENCOUNTER — TELEPHONE (OUTPATIENT)
Dept: PRIMARY CARE | Facility: CLINIC | Age: 64
End: 2024-05-07
Payer: COMMERCIAL

## 2024-05-07 DIAGNOSIS — E78.5 HYPERLIPIDEMIA, UNSPECIFIED HYPERLIPIDEMIA TYPE: ICD-10-CM

## 2024-05-08 RX ORDER — ICOSAPENT ETHYL 1 G/1
CAPSULE ORAL
Qty: 360 CAPSULE | Refills: 3 | Status: SHIPPED | OUTPATIENT
Start: 2024-05-08

## 2024-05-08 NOTE — TELEPHONE ENCOUNTER
Omeprazole should have several refills, patient needs to call pharmacy.  I did refill the Vascepa, thanks.

## 2024-07-05 ENCOUNTER — LAB (OUTPATIENT)
Dept: LAB | Facility: LAB | Age: 64
End: 2024-07-05
Payer: COMMERCIAL

## 2024-07-05 DIAGNOSIS — E78.01 FAMILIAL HYPERCHOLESTEROLEMIA: ICD-10-CM

## 2024-07-05 DIAGNOSIS — Z13.9 SCREENING FOR CONDITION: ICD-10-CM

## 2024-07-05 DIAGNOSIS — E55.9 VITAMIN D DEFICIENCY: ICD-10-CM

## 2024-07-05 DIAGNOSIS — Z79.899 DRUG THERAPY: ICD-10-CM

## 2024-07-05 DIAGNOSIS — R73.03 PREDIABETES: ICD-10-CM

## 2024-07-05 DIAGNOSIS — Z00.00 PREVENTATIVE HEALTH CARE: ICD-10-CM

## 2024-07-05 LAB
ALBUMIN SERPL BCP-MCNC: 4.7 G/DL (ref 3.4–5)
ALP SERPL-CCNC: 36 U/L (ref 33–136)
ALT SERPL W P-5'-P-CCNC: 15 U/L (ref 10–52)
ANION GAP SERPL CALC-SCNC: 13 MMOL/L (ref 10–20)
APPEARANCE UR: CLEAR
AST SERPL W P-5'-P-CCNC: 13 U/L (ref 9–39)
BASOPHILS # BLD AUTO: 0.04 X10*3/UL (ref 0–0.1)
BASOPHILS NFR BLD AUTO: 0.5 %
BILIRUB SERPL-MCNC: 0.6 MG/DL (ref 0–1.2)
BILIRUB UR STRIP.AUTO-MCNC: NEGATIVE MG/DL
BUN SERPL-MCNC: 20 MG/DL (ref 6–23)
CALCIUM SERPL-MCNC: 10.2 MG/DL (ref 8.6–10.3)
CHLORIDE SERPL-SCNC: 103 MMOL/L (ref 98–107)
CHOLEST SERPL-MCNC: 179 MG/DL (ref 0–199)
CHOLESTEROL/HDL RATIO: 4.6
CO2 SERPL-SCNC: 26 MMOL/L (ref 21–32)
COLOR UR: NORMAL
CREAT SERPL-MCNC: 1.12 MG/DL (ref 0.5–1.3)
EGFRCR SERPLBLD CKD-EPI 2021: 74 ML/MIN/1.73M*2
EOSINOPHIL # BLD AUTO: 0.3 X10*3/UL (ref 0–0.7)
EOSINOPHIL NFR BLD AUTO: 3.7 %
ERYTHROCYTE [DISTWIDTH] IN BLOOD BY AUTOMATED COUNT: 13.6 % (ref 11.5–14.5)
EST. AVERAGE GLUCOSE BLD GHB EST-MCNC: 111 MG/DL
GLUCOSE SERPL-MCNC: 103 MG/DL (ref 74–99)
GLUCOSE UR STRIP.AUTO-MCNC: NORMAL MG/DL
HBA1C MFR BLD: 5.5 %
HCT VFR BLD AUTO: 47 % (ref 41–52)
HDLC SERPL-MCNC: 38.8 MG/DL
HGB BLD-MCNC: 16.2 G/DL (ref 13.5–17.5)
IMM GRANULOCYTES # BLD AUTO: 0.03 X10*3/UL (ref 0–0.7)
IMM GRANULOCYTES NFR BLD AUTO: 0.4 % (ref 0–0.9)
KETONES UR STRIP.AUTO-MCNC: NEGATIVE MG/DL
LDLC SERPL CALC-MCNC: 99 MG/DL
LEUKOCYTE ESTERASE UR QL STRIP.AUTO: NEGATIVE
LYMPHOCYTES # BLD AUTO: 2.5 X10*3/UL (ref 1.2–4.8)
LYMPHOCYTES NFR BLD AUTO: 30.8 %
MAGNESIUM SERPL-MCNC: 1.74 MG/DL (ref 1.6–2.4)
MCH RBC QN AUTO: 31.9 PG (ref 26–34)
MCHC RBC AUTO-ENTMCNC: 34.5 G/DL (ref 32–36)
MCV RBC AUTO: 93 FL (ref 80–100)
MONOCYTES # BLD AUTO: 0.62 X10*3/UL (ref 0.1–1)
MONOCYTES NFR BLD AUTO: 7.6 %
MUCOUS THREADS #/AREA URNS AUTO: NORMAL /LPF
NEUTROPHILS # BLD AUTO: 4.64 X10*3/UL (ref 1.2–7.7)
NEUTROPHILS NFR BLD AUTO: 57 %
NITRITE UR QL STRIP.AUTO: NEGATIVE
NON HDL CHOLESTEROL: 140 MG/DL (ref 0–149)
NRBC BLD-RTO: 0 /100 WBCS (ref 0–0)
PH UR STRIP.AUTO: 8 [PH]
PLATELET # BLD AUTO: 313 X10*3/UL (ref 150–450)
POTASSIUM SERPL-SCNC: 4.4 MMOL/L (ref 3.5–5.3)
PROT SERPL-MCNC: 7.5 G/DL (ref 6.4–8.2)
PROT UR STRIP.AUTO-MCNC: NORMAL MG/DL
RBC # BLD AUTO: 5.08 X10*6/UL (ref 4.5–5.9)
RBC # UR STRIP.AUTO: NEGATIVE /UL
RBC #/AREA URNS AUTO: NORMAL /HPF
SODIUM SERPL-SCNC: 138 MMOL/L (ref 136–145)
SP GR UR STRIP.AUTO: 1.02
TRIGL SERPL-MCNC: 204 MG/DL (ref 0–149)
TSH SERPL-ACNC: 0.74 MIU/L (ref 0.44–3.98)
UROBILINOGEN UR STRIP.AUTO-MCNC: NORMAL MG/DL
VLDL: 41 MG/DL (ref 0–40)
WBC # BLD AUTO: 8.1 X10*3/UL (ref 4.4–11.3)
WBC #/AREA URNS AUTO: NORMAL /HPF

## 2024-07-05 PROCEDURE — 80053 COMPREHEN METABOLIC PANEL: CPT

## 2024-07-05 PROCEDURE — 80061 LIPID PANEL: CPT

## 2024-07-05 PROCEDURE — 36415 COLL VENOUS BLD VENIPUNCTURE: CPT

## 2024-07-05 PROCEDURE — 81001 URINALYSIS AUTO W/SCOPE: CPT

## 2024-07-05 PROCEDURE — 83735 ASSAY OF MAGNESIUM: CPT

## 2024-07-05 PROCEDURE — 82652 VIT D 1 25-DIHYDROXY: CPT

## 2024-07-05 PROCEDURE — 84443 ASSAY THYROID STIM HORMONE: CPT

## 2024-07-05 PROCEDURE — 85025 COMPLETE CBC W/AUTO DIFF WBC: CPT

## 2024-07-05 PROCEDURE — 83036 HEMOGLOBIN GLYCOSYLATED A1C: CPT

## 2024-07-07 LAB — 1,25(OH)2D3 SERPL-MCNC: 88.7 PG/ML (ref 19.9–79.3)

## 2024-07-16 ENCOUNTER — APPOINTMENT (OUTPATIENT)
Dept: PRIMARY CARE | Facility: CLINIC | Age: 64
End: 2024-07-16
Payer: COMMERCIAL

## 2024-07-16 VITALS
WEIGHT: 163 LBS | HEART RATE: 78 BPM | SYSTOLIC BLOOD PRESSURE: 139 MMHG | OXYGEN SATURATION: 97 % | HEIGHT: 68 IN | DIASTOLIC BLOOD PRESSURE: 92 MMHG | BODY MASS INDEX: 24.71 KG/M2

## 2024-07-16 DIAGNOSIS — R73.03 PREDIABETES: ICD-10-CM

## 2024-07-16 DIAGNOSIS — E55.9 VITAMIN D DEFICIENCY: ICD-10-CM

## 2024-07-16 DIAGNOSIS — E78.5 HYPERLIPIDEMIA, UNSPECIFIED HYPERLIPIDEMIA TYPE: ICD-10-CM

## 2024-07-16 DIAGNOSIS — Z79.899 DRUG THERAPY: ICD-10-CM

## 2024-07-16 DIAGNOSIS — B00.9 HERPES SIMPLEX: ICD-10-CM

## 2024-07-16 DIAGNOSIS — E78.01 FAMILIAL HYPERCHOLESTEROLEMIA: ICD-10-CM

## 2024-07-16 DIAGNOSIS — Z00.00 PREVENTATIVE HEALTH CARE: Primary | ICD-10-CM

## 2024-07-16 DIAGNOSIS — Z13.9 SCREENING FOR CONDITION: ICD-10-CM

## 2024-07-16 PROBLEM — J30.9 ALLERGIC SINUSITIS: Status: RESOLVED | Noted: 2024-07-16 | Resolved: 2024-07-16

## 2024-07-16 PROBLEM — R20.2 NUMBNESS AND TINGLING SENSATION OF SKIN: Status: RESOLVED | Noted: 2024-07-16 | Resolved: 2024-07-16

## 2024-07-16 PROBLEM — M54.9 CHRONIC BACK PAIN: Status: RESOLVED | Noted: 2018-05-17 | Resolved: 2024-07-16

## 2024-07-16 PROBLEM — R07.81 RIB PAIN: Status: RESOLVED | Noted: 2024-07-16 | Resolved: 2024-07-16

## 2024-07-16 PROBLEM — F17.210 NICOTINE DEPENDENCE, CIGARETTES, UNCOMPLICATED: Status: RESOLVED | Noted: 2018-05-19 | Resolved: 2024-07-16

## 2024-07-16 PROBLEM — D80.8 LIGHT CHAIN DISEASE (MULTI): Status: RESOLVED | Noted: 2024-07-16 | Resolved: 2024-07-16

## 2024-07-16 PROBLEM — R82.994 HYPERCALCIURIA: Status: RESOLVED | Noted: 2024-01-17 | Resolved: 2024-07-16

## 2024-07-16 PROBLEM — K64.9 HEMORRHOIDS: Status: RESOLVED | Noted: 2024-07-16 | Resolved: 2024-07-16

## 2024-07-16 PROBLEM — K21.9 GASTROESOPHAGEAL REFLUX DISEASE: Status: RESOLVED | Noted: 2024-07-16 | Resolved: 2024-07-16

## 2024-07-16 PROBLEM — M50.122 CERVICAL DISC DISORDER AT C5-C6 LEVEL WITH RADICULOPATHY: Status: RESOLVED | Noted: 2024-07-16 | Resolved: 2024-07-16

## 2024-07-16 PROBLEM — M51.26 HERNIATION OF LUMBAR INTERVERTEBRAL DISC WITHOUT MYELOPATHY: Status: RESOLVED | Noted: 2024-07-16 | Resolved: 2024-07-16

## 2024-07-16 PROBLEM — N20.0 CALCULUS OF KIDNEY: Status: ACTIVE | Noted: 2018-02-16

## 2024-07-16 PROBLEM — E83.52 HYPERCALCEMIA: Status: RESOLVED | Noted: 2024-01-17 | Resolved: 2024-07-16

## 2024-07-16 PROBLEM — G89.29 CHRONIC BACK PAIN: Status: RESOLVED | Noted: 2018-05-17 | Resolved: 2024-07-16

## 2024-07-16 PROBLEM — K63.9 COLON ABNORMALITY: Status: RESOLVED | Noted: 2024-07-16 | Resolved: 2024-07-16

## 2024-07-16 PROBLEM — N39.0 ACUTE URINARY TRACT INFECTION: Status: RESOLVED | Noted: 2018-05-19 | Resolved: 2024-07-16

## 2024-07-16 PROBLEM — F10.10 ALCOHOL ABUSE: Status: RESOLVED | Noted: 2024-06-06 | Resolved: 2024-07-16

## 2024-07-16 PROBLEM — N13.2 HYDRONEPHROSIS WITH URINARY OBSTRUCTION DUE TO RENAL CALCULUS: Status: RESOLVED | Noted: 2024-07-16 | Resolved: 2024-07-16

## 2024-07-16 PROBLEM — B00.89 HERPES DERMATITIS: Status: RESOLVED | Noted: 2024-07-16 | Resolved: 2024-07-16

## 2024-07-16 PROBLEM — J01.90 ACUTE SINUSITIS: Status: RESOLVED | Noted: 2024-07-16 | Resolved: 2024-07-16

## 2024-07-16 PROBLEM — R82.991 HYPOCITRATURIA: Status: RESOLVED | Noted: 2024-07-16 | Resolved: 2024-07-16

## 2024-07-16 PROBLEM — M25.519 SHOULDER PAIN: Status: RESOLVED | Noted: 2024-07-16 | Resolved: 2024-07-16

## 2024-07-16 PROBLEM — F41.9 ANXIETY: Status: RESOLVED | Noted: 2024-07-16 | Resolved: 2024-07-16

## 2024-07-16 PROBLEM — E21.3 HYPERPARATHYROIDISM (MULTI): Status: RESOLVED | Noted: 2024-04-17 | Resolved: 2024-07-16

## 2024-07-16 PROBLEM — R35.1 NOCTURIA: Status: RESOLVED | Noted: 2024-07-16 | Resolved: 2024-07-16

## 2024-07-16 PROBLEM — F41.9 ANXIETY DISORDER, UNSPECIFIED: Status: RESOLVED | Noted: 2023-10-25 | Resolved: 2024-07-16

## 2024-07-16 PROBLEM — K57.32 DIVERTICULITIS OF COLON: Status: RESOLVED | Noted: 2024-03-18 | Resolved: 2024-07-16

## 2024-07-16 PROBLEM — M54.2 NECK PAIN: Status: RESOLVED | Noted: 2024-07-16 | Resolved: 2024-07-16

## 2024-07-16 PROBLEM — K85.90 PANCREATITIS (HHS-HCC): Status: RESOLVED | Noted: 2024-07-16 | Resolved: 2024-07-16

## 2024-07-16 PROBLEM — F17.200 SMOKER: Status: RESOLVED | Noted: 2024-04-02 | Resolved: 2024-07-16

## 2024-07-16 PROBLEM — R10.9 ABDOMINAL PAIN: Status: RESOLVED | Noted: 2018-05-19 | Resolved: 2024-07-16

## 2024-07-16 PROBLEM — J02.9 PHARYNGITIS: Status: RESOLVED | Noted: 2024-07-16 | Resolved: 2024-07-16

## 2024-07-16 PROBLEM — K21.9 GASTROESOPHAGEAL REFLUX DISEASE: Status: RESOLVED | Noted: 2023-04-25 | Resolved: 2024-07-16

## 2024-07-16 PROBLEM — M48.02 NEURAL FORAMINAL STENOSIS OF CERVICAL SPINE: Status: RESOLVED | Noted: 2024-07-16 | Resolved: 2024-07-16

## 2024-07-16 PROBLEM — F32.A ACUTE DEPRESSION: Status: RESOLVED | Noted: 2024-07-16 | Resolved: 2024-07-16

## 2024-07-16 PROBLEM — M47.814 THORACIC SPONDYLOSIS: Status: RESOLVED | Noted: 2024-07-16 | Resolved: 2024-07-16

## 2024-07-16 PROBLEM — R31.9 HEMATURIA: Status: RESOLVED | Noted: 2024-07-16 | Resolved: 2024-07-16

## 2024-07-16 PROBLEM — K63.9 COLON DISORDER: Status: RESOLVED | Noted: 2024-07-16 | Resolved: 2024-07-16

## 2024-07-16 PROBLEM — K25.0 ACUTE GASTRIC ULCER WITH HEMORRHAGE: Status: RESOLVED | Noted: 2024-07-16 | Resolved: 2024-07-16

## 2024-07-16 PROBLEM — E83.42 HYPOMAGNESEMIA: Status: RESOLVED | Noted: 2024-07-16 | Resolved: 2024-07-16

## 2024-07-16 PROBLEM — J32.9 SINUSITIS: Status: RESOLVED | Noted: 2024-07-16 | Resolved: 2024-07-16

## 2024-07-16 PROBLEM — N40.0 ENLARGED PROSTATE: Status: RESOLVED | Noted: 2024-07-16 | Resolved: 2024-07-16

## 2024-07-16 PROBLEM — N23 URETERAL COLIC: Status: RESOLVED | Noted: 2024-07-16 | Resolved: 2024-07-16

## 2024-07-16 PROBLEM — N20.1 URETERAL CALCULUS: Status: RESOLVED | Noted: 2024-07-16 | Resolved: 2024-07-16

## 2024-07-16 PROBLEM — R31.9 HEMATURIA: Status: RESOLVED | Noted: 2018-05-17 | Resolved: 2024-07-16

## 2024-07-16 PROBLEM — R00.0 TACHYCARDIA: Status: RESOLVED | Noted: 2024-07-16 | Resolved: 2024-07-16

## 2024-07-16 PROBLEM — S22.000A COMPRESSION FRACTURE OF THORACIC VERTEBRA (MULTI): Status: RESOLVED | Noted: 2024-07-16 | Resolved: 2024-07-16

## 2024-07-16 PROBLEM — R03.0 BLOOD PRESSURE ELEVATED WITHOUT HISTORY OF HTN: Status: RESOLVED | Noted: 2024-07-16 | Resolved: 2024-07-16

## 2024-07-16 PROBLEM — I50.9 CONGESTIVE HEART FAILURE (MULTI): Status: RESOLVED | Noted: 2024-07-16 | Resolved: 2024-07-16

## 2024-07-16 PROBLEM — R20.0 NUMBNESS AND TINGLING SENSATION OF SKIN: Status: RESOLVED | Noted: 2024-07-16 | Resolved: 2024-07-16

## 2024-07-16 PROBLEM — M47.812 CERVICAL SPONDYLOSIS: Status: RESOLVED | Noted: 2024-07-16 | Resolved: 2024-07-16

## 2024-07-16 PROBLEM — M54.50 LOW BACK PAIN: Status: RESOLVED | Noted: 2023-04-25 | Resolved: 2024-07-16

## 2024-07-16 PROBLEM — M81.0 OSTEOPOROSIS: Status: RESOLVED | Noted: 2024-01-17 | Resolved: 2024-07-16

## 2024-07-16 PROBLEM — F17.200 TOBACCO USE DISORDER: Status: RESOLVED | Noted: 2024-07-16 | Resolved: 2024-07-16

## 2024-07-16 PROBLEM — R93.5 ABNORMAL CT OF THE ABDOMEN: Status: RESOLVED | Noted: 2024-07-16 | Resolved: 2024-07-16

## 2024-07-16 PROBLEM — C44.90 SKIN CANCER: Status: RESOLVED | Noted: 2024-07-16 | Resolved: 2024-07-16

## 2024-07-16 PROBLEM — J30.2 SEASONAL ALLERGIES: Status: RESOLVED | Noted: 2024-07-16 | Resolved: 2024-07-16

## 2024-07-16 PROBLEM — R93.5 ABNORMAL COMPUTERIZED AXIAL TOMOGRAPHY OF ABDOMEN: Status: RESOLVED | Noted: 2024-07-16 | Resolved: 2024-07-16

## 2024-07-16 PROBLEM — N13.30 HYDRONEPHROSIS: Status: RESOLVED | Noted: 2024-07-16 | Resolved: 2024-07-16

## 2024-07-16 PROCEDURE — 99396 PREV VISIT EST AGE 40-64: CPT | Performed by: FAMILY MEDICINE

## 2024-07-16 PROCEDURE — 99214 OFFICE O/P EST MOD 30 MIN: CPT | Performed by: FAMILY MEDICINE

## 2024-07-16 RX ORDER — ROSUVASTATIN CALCIUM 40 MG/1
TABLET, COATED ORAL
Qty: 90 TABLET | Refills: 3 | Status: SHIPPED | OUTPATIENT
Start: 2024-07-16

## 2024-07-16 NOTE — PATIENT INSTRUCTIONS
annual wellness visit     -----  The next flu shot will be due next month.   Is up to date on shingles immunizations.   Due for latest COVID booster.  Due for the new RSV shot.  Sounds like patient is due for tetanus. -->> Recommend checking with your drugstore to get up-to-date on immunizations.      Screen for hepatitis C was negative november 2018.     Screening for colon cancer, prostate cancer -->> Sounds like most recent colonoscopy was around 2016, sounds like no polyps or other problem so probably due in 2026 for next. PSAs are being screened with urology.     -----    Cardiac CT calcium score was 213. -->> We will aggressively manage risk factors including blood pressure and cholesterol so that LDL is below 70 will be our goal.     BMI 24, down another 2 pounds since last appointment here.     New annual labs reviewed:    - Drug Therapy, -->> Plan to repeat annual labs around June 2024.     - Hyperlipidemia, familial hypercholesterolemia much improved from before but still not quite good enough onon Crestor/rosuvastatin 40 mg, fenofibrate 200 mg, Vascepa, and flush free niacin. We discussed possibly starting an  Repatha, that once or twice a month. Patient declines at this time. -->>  patient is going to investigate Repatha.  If you would like to start it, call us and I will send in a prescription.    - Prediabetes, A1C 5.5, was 5.7, 5.7, 5.4, 5.8, 5.5, 5.8, 5.6. Patient has improved diet. -->> Continue to watch your carbohydrate and sugar intake. We'll recheck with her next annual labs.    - Vitamin D deficiency, 45, was 27, 34, 27, 30, 36, 30, 26, 28, 26, goal is 75 - 100.  She is now on vitamin D, 5000 units daily. -->> Will recheck with next annual labs.    Osteoporosis, Hyperparathyroidism with hypercalcemia.  Has had partial parathyroidectomy. .  Having noted compression fractures T8 and 9, at different time intervals apparently.  Is seeing a calcium specialist who says to take 5000 units daily  vitamin D3.  seeing endocrinology/Dr. Allen. -->> f/up as planned.     Herpes simplex, gets cold sores rarely, says the acyclovir works very well when he takes it at first symptom of a cold sore. -->> Refill acyclovir as needed.     Smokes about a pack a day. -->> Advised to quit.    GERD well-controlled on omeprazole.  Will refill as needed.    Kidney stones.  Seeing urology, follow-up as planned.          - Patient will return around July 2025 for annual preventative visit and lab review.  - Patient will go about a week before next appointment to get fasting annual labs done at  facility in [place] or [place] or where.

## 2024-07-16 NOTE — PROGRESS NOTES
Subjective   Patient ID: Usman Villarreal is a 64 y.o. male who presents for Annual Check Up (Pt in today for his annual check up).    Review of Systems  Denies N/V/D/C, no HA/S/V, denies rashes/lesions, no CP/SOB. Denies fevers/chills.  Positive for back pain.  All other systems were negative.    Objective   Physical Exam  Gen: NAD  eyes: EOMI, PERRLA  ENT: hearing grossly intact, no nasal discharge  resp: CTABL, without R/R  heart: RRR without MRG  GI: abd: S/ND/NT, BS+  lymph: no axillary, cervical, supraclavicular lymphadenopathy noted   MS: gait grossly WNL,  derm: no rashes or lesions noted  neuro: CN II-XII grossly intact  psych: A&Ox3    Assessment/Plan   Diagnoses and all orders for this visit:  Preventative health care  -     CBC and Auto Differential; Future  -     Comprehensive Metabolic Panel; Future  -     TSH with reflex to Free T4 if abnormal; Future  -     Magnesium; Future  -     Lipid Panel; Future  -     Hemoglobin A1C; Future  -     Urinalysis with Reflex Microscopic; Future  -     Vitamin D 25-Hydroxy,Total (for eval of Vitamin D levels); Future  Hyperlipidemia, unspecified hyperlipidemia type  -     Lipid Panel; Future  Drug therapy  -     CBC and Auto Differential; Future  -     Comprehensive Metabolic Panel; Future  -     Magnesium; Future  -     Urinalysis with Reflex Microscopic; Future  Screening for condition  -     TSH with reflex to Free T4 if abnormal; Future  -     Lipid Panel; Future  -     Hemoglobin A1C; Future  Vitamin D deficiency  -     Vitamin D 25-Hydroxy,Total (for eval of Vitamin D levels); Future  Herpes simplex  Familial hypercholesterolemia  -     rosuvastatin (Crestor) 40 mg tablet; 1 po qd  -     Lipid Panel; Future  Prediabetes  -     Hemoglobin A1C; Future          Patient Discussion/Summary     annual wellness visit     -----  The next flu shot will be due next month.   Is up to date on shingles immunizations.   Due for latest COVID booster.  Due for the new RSV  shot.  Sounds like patient is due for tetanus. -->> Recommend checking with your drugstore to get up-to-date on immunizations.      Screen for hepatitis C was negative november 2018.     Screening for colon cancer, prostate cancer -->> Sounds like most recent colonoscopy was around 2016, sounds like no polyps or other problem so probably due in 2026 for next. PSAs are being screened with urology.     -----    Cardiac CT calcium score was 213. -->> We will aggressively manage risk factors including blood pressure and cholesterol so that LDL is below 70 will be our goal.     BMI 24, down another 2 pounds since last appointment here.     New annual labs reviewed:    - Drug Therapy, -->> Plan to repeat annual labs around June 2024.     - Hyperlipidemia, familial hypercholesterolemia much improved from before but still not quite good enough onon Crestor/rosuvastatin 40 mg, fenofibrate 200 mg, Vascepa, and flush free niacin. We discussed possibly starting an  Repatha, that once or twice a month. Patient declines at this time. -->>  patient is going to investigate Repatha.  If you would like to start it, call us and I will send in a prescription.    - Prediabetes, A1C 5.5, was 5.7, 5.7, 5.4, 5.8, 5.5, 5.8, 5.6. Patient has improved diet. -->> Continue to watch your carbohydrate and sugar intake. We'll recheck with her next annual labs.    - Vitamin D deficiency, 45, was 27, 34, 27, 30, 36, 30, 26, 28, 26, goal is 75 - 100.  She is now on vitamin D, 5000 units daily. -->> Will recheck with next annual labs.    Osteoporosis, Hyperparathyroidism with hypercalcemia.  Has had partial parathyroidectomy. .  Having noted compression fractures T8 and 9, at different time intervals apparently.  Is seeing a calcium specialist who says to take 5000 units daily vitamin D3.  seeing endocrinology/Dr. Allen. -->> f/up as planned.     Herpes simplex, gets cold sores rarely, says the acyclovir works very well when he takes it at first  symptom of a cold sore. -->> Refill acyclovir as needed.     Smokes about a pack a day. -->> Advised to quit.    GERD well-controlled on omeprazole.  Will refill as needed.    Kidney stones.  Seeing urology, follow-up as planned.          - Patient will return around July 2025 for annual preventative visit and lab review.  - Patient will go about a week before next appointment to get fasting annual labs done at  facility in [place] or [place] or where.

## 2024-07-22 ENCOUNTER — APPOINTMENT (OUTPATIENT)
Dept: PRIMARY CARE | Facility: CLINIC | Age: 64
End: 2024-07-22
Payer: COMMERCIAL

## 2024-08-27 DIAGNOSIS — R07.81 PLEURODYNIA: ICD-10-CM

## 2024-08-27 RX ORDER — FENOFIBRATE 200 MG/1
200 CAPSULE ORAL DAILY
Qty: 90 CAPSULE | Refills: 3 | Status: SHIPPED | OUTPATIENT
Start: 2024-08-27

## 2024-11-14 DIAGNOSIS — K21.9 GASTRO-ESOPHAGEAL REFLUX DISEASE WITHOUT ESOPHAGITIS: ICD-10-CM

## 2024-11-14 RX ORDER — OMEPRAZOLE 40 MG/1
40 CAPSULE, DELAYED RELEASE ORAL DAILY
Qty: 90 CAPSULE | Refills: 3 | Status: SHIPPED | OUTPATIENT
Start: 2024-11-14

## 2025-02-12 ENCOUNTER — LAB (OUTPATIENT)
Dept: LAB | Facility: HOSPITAL | Age: 65
End: 2025-02-12
Payer: COMMERCIAL

## 2025-02-12 DIAGNOSIS — R82.89 ABNORMAL URINE CYTOLOGY: ICD-10-CM

## 2025-02-12 PROCEDURE — 88112 CYTOPATH CELL ENHANCE TECH: CPT

## 2025-02-14 LAB
BACTERIA UR CULT: NORMAL
LABORATORY COMMENT REPORT: NORMAL
LABORATORY COMMENT REPORT: NORMAL
PATH REPORT.FINAL DX SPEC: NORMAL
PATH REPORT.GROSS SPEC: NORMAL
PATH REPORT.RELEVANT HX SPEC: NORMAL
PATH REPORT.TOTAL CANCER: NORMAL
PSA SERPL-MCNC: 1.95 NG/ML

## 2025-02-18 ENCOUNTER — TRANSCRIBE ORDERS (OUTPATIENT)
Dept: ADMINISTRATIVE | Age: 65
End: 2025-02-18

## 2025-02-18 DIAGNOSIS — N20.0 NEPHROLITHIASIS, URIC ACID: Primary | ICD-10-CM

## 2025-02-25 ENCOUNTER — HOSPITAL ENCOUNTER (OUTPATIENT)
Dept: ULTRASOUND IMAGING | Age: 65
Discharge: HOME | End: 2025-02-27
Payer: COMMERCIAL

## 2025-02-25 DIAGNOSIS — N20.0 NEPHROLITHIASIS, URIC ACID: ICD-10-CM

## 2025-02-25 PROCEDURE — 76770 US EXAM ABDO BACK WALL COMP: CPT

## 2025-02-25 PROCEDURE — 51798 US URINE CAPACITY MEASURE: CPT

## 2025-03-04 ENCOUNTER — APPOINTMENT (OUTPATIENT)
Dept: UROLOGY | Facility: CLINIC | Age: 65
End: 2025-03-04
Payer: COMMERCIAL

## 2025-03-04 VITALS
DIASTOLIC BLOOD PRESSURE: 90 MMHG | HEIGHT: 68 IN | RESPIRATION RATE: 16 BRPM | BODY MASS INDEX: 26.03 KG/M2 | HEART RATE: 87 BPM | SYSTOLIC BLOOD PRESSURE: 146 MMHG | WEIGHT: 171.74 LBS

## 2025-03-04 DIAGNOSIS — N20.0 CALCULUS OF KIDNEY: Primary | ICD-10-CM

## 2025-03-04 DIAGNOSIS — R82.89 ABNORMAL URINE CYTOLOGY: ICD-10-CM

## 2025-03-04 DIAGNOSIS — R31.0 GROSS HEMATURIA: ICD-10-CM

## 2025-03-04 PROCEDURE — G2211 COMPLEX E/M VISIT ADD ON: HCPCS | Performed by: UROLOGY

## 2025-03-04 PROCEDURE — 3008F BODY MASS INDEX DOCD: CPT | Performed by: UROLOGY

## 2025-03-04 PROCEDURE — 99214 OFFICE O/P EST MOD 30 MIN: CPT | Performed by: UROLOGY

## 2025-03-04 RX ORDER — GABAPENTIN 300 MG/1
200 CAPSULE ORAL 3 TIMES DAILY
COMMUNITY

## 2025-03-04 ASSESSMENT — ENCOUNTER SYMPTOMS
HEMATURIA: 0
DIFFICULTY URINATING: 0

## 2025-03-04 ASSESSMENT — PAIN SCALES - GENERAL: PAINLEVEL_OUTOF10: 2

## 2025-03-04 NOTE — PATIENT INSTRUCTIONS
"  Patient Discussion/Summary     It was nice to see you once again. I am happy to hear that you your parathyroid surgery well. Your urinalysis shows no blood, urine culture no growth.  However, urine cytology is atypical and they cannot rule out a possible polyp or tumor.  In addition, you are experiencing urinary urgency.  I have recommended a cystoscopy with flow studies for further evaluation.  Your PSA is normal at 1.95.  Your ultrasound shows tiny\" stones in each kidney. No treatment required.  HAPPY BIRTHDAY.      This note was created with voice-recognition software and was not corrected for typographical or grammatical errors.          "

## 2025-03-04 NOTE — LETTER
"March 4, 2025     Jamie Waller DO  [address] [facility], [address]  [facility] [address]    Patient: Usman Villarreal   YOB: 1960   Date of Visit: 3/4/2025       Dear Dr. Jamie Waller DO:    Thank you for referring Usman Villarreal to me for evaluation. Below are my notes for this consultation.  If you have questions, please do not hesitate to call me. I look forward to following your patient along with you.       Sincerely,     Khanh Hogan MD      CC: No Recipients  ______________________________________________________________________________________         Provider Impressions     65 year-old white male referred by Dr. Waller for BACK PAIN, HEMATURIA, bilateral KIDNEY STONES. Patient is a  by occupation. He has a positive family history of breast cancer. He has a 40-pack-year cigarette smoking history and now smokes one pack of cigarettes per day.     04/14/18, urinalysis, urine culture and urine cytology are negative. PSA 0.84. Several CAT scans and a CT urogram demonstrated an 8 mm left RENAL CALCULUS and a 4 mm right RENAL CALCULUS. Benefits benefits and risks described. Patient will undergo ESWL of his left RENAL CALCULUS on 05/17/18. The CAT scans have shown CHRONIC PANCREATITIS also. He has seen Dr. Andrea in consultation.     05/17/18, OR, CYSTOSCOPY, left RETROPYELOGRAM, placement of left 6 Polish by 26 cm double-J URETERAL STENT, ESWL of a 0.7 cm left lower pole STONE.     05/19/18, renal colic CAT scan identifies \"small\" left STONES no sizes were given.     05/23/18, KUB reveals tiny left RENAL STONE remaining.     05/26/18, in office CYSTOSCOPY with REMOVAL of left URETERAL STENT     03/12/19, patient returns with no complaints. Urine culture is normal with no growth. Parathyroid hormone normal at 40.9. PSA normal at 1.4. Urinalysis is negative. Creatinine 1.26. 24-hour urine shows HIGH OXALATE. Renal colic CAT scan does show " a 5 mm STONE in the right kidney. URINE CYTOLOGY IS ATYPICAL WITH PAPILLARY CLUSTERS. He does have a significant cigarette smoking history. I offered the patient a cystoscopy under local anesthesia for evaluation of his abnormal cytology. He ABSOLUTELY REFUSES ANY LOCAL PROCEDURES. He also ABSOLUTELY REFUSES ANY URETERAL STENTS. Therefore, he will return in 1 month with an IVP to determine whether to proceed with ESWL. If he does have a large stone, we would perform ESWL, cystoscopy and possible TURBT. If his stone is less than 5 mm, then he will undergo a cystoscopy with possible TURBT in the operating room.     04/26/19, patient continues to have no complaints. IVP was performed which identified a 4 mm lower pole right RENAL CALCULUS which we will continue to observe. However with attention to his ABNORMAL CYTOLOGY, the patient is now willing to undergo a cystoscopy in the office under local anesthesia.     05/01/19, cystoscopy today does not reveal any evidence of tumors or stones within the bladder. The patient will return in March of next year for his yearly evaluation.     03/06/20, patient arrives alone. He has no new complaints. Urinalysis and urine culture negative. Urine cytology once again shows atypical cells and cannot rule out urothelial neoplasm. We will proceed with a cystoscopy. This is a second year and a row in this 40-pack-year cigarette smoker and pack-a-day smoker. If the cystoscopy is negative and another abnormal cytology occurs, I will send him to a different urologist for further evaluation. Renal ultrasound shows no stones bilaterally. PSA is normal at 1.00. He will return for cystoscopy     06/16/20, cystoscopy today does not reveal any evidence of stones, lesions or tumors. In light of 2 questionable urine cytologies and a daily cigarette smoker, I will repeat the cytology in 3 months. If it is still suspicious for urothelial neoplasm, I will refer him to Dr. Jenkins for further  "evaluation and treatment as necessary. He would most probably require ureteroscopy at that point.     09/04/20, patient arrives alone. His repeat urine cytology is negative for malignant cells. We will continue to follow. He will return in March.     February 22, 2021, patient arrives alone. He is extremely anxious. A renal ultrasound showed bilateral 8 mm stones, the one on the right side was in the UPJ. This was confirmed with a CT urogram showing a 6 mm stone just distal to the right UPJ. No left stone was seen. Urine tests were all normal including urinalysis, urine culture and cytology. PSA is normal at 1.50 creatinine 1.30. Patient has absolutely no pain at this time. I have scheduled him for ESWL of his right proximal ureteral calculus. He absolutely refuses ureteral stents due to his last experience. He has been reading extensively online and wishes to have ureteroscopy with laser lithotripsy performed by Dr. Lay. I have absolutely no problem canceling his surgery and scheduling him with Dr. Lay which we will do. He may choose to have all follow-up with Dr. Lay or return to me next year. Which ever is in his best interest. He is very very anxious about having ESWL again and experiencing discomfort.     March 9, 2021 OR, Dr. Lay, ESWL and right retrograde, ureteroscopy and basket extraction of stones.     October 6, 2021, Dr. Lay, office, follow-up visit.     March 15, 2022, patient arrives alone. He is extremely anxious and worried. Urinalysis is negative for blood. Urine culture no growth. Urine cytology again is atypical cannot rule out neoplasm. I have offered him a cystoscopy in the office. PSA is normal at 1.50. Renal ultrasound shows only a 4 mm stone in the right kidney. His last experience with me and with Dr. Lay with the ureteral stent was \"the worst experience of my life\". He is petrified that the for 4 mm stone will grow and that he will need a procedure with a stent. He is " "strongly asking for ESWL of a 4 mm stone without a stent and at the same time evaluating his bladder for his abnormal cytology. Before I would proceed, I would like a renal colic CAT scan and KUB with upright. He will return in 4 weeks.     April 11, 2022, patient arrives alone. Renal colic CAT scan identifies a 3 mm stone at the right UPJ. Also sigmoid colon thickening recommending a colonoscopy. We will contact Dr. Ramírez Smallwood for evaluation, gastroenterologist. We will schedule the patient for ESWL of the stone and also possible TURBT due to his abnormal cytology. He is extremely anxious. He is scheduled for May 12, 2022 and Dr. Jamie Perdue will provide preoperative medical risk stratification.     May 12, 2022, OR, cystoscopy with urethral dilatation, right ureteroscopy, ESWL of a 4 mm right UPJ stone.     May 18, 2023, postoperative KUB, no stone seen.     March 7, 2023, patient arrives alone. He is now on complete disability. He is extremely anxious about all of his medical conditions. He no longer has any flank pain. PSA is normal at 1.54. Urinalysis is negative for blood. Urine culture shows no growth. Urine cytology is lacking atypia. Renal ultrasound shows an 8 mm simple right renal cyst and a \"punctate\" stone in the left renal sinus fat. He will return in 1 year.     April 27, 2023, OR, cystoscopy with urethral dilatation, right retropyelogram, right ureteroscopy with removal of 4 separate 4 mm proximal ureteral calculi     CALCIUM PHOSPHATE stones     May 3, 2023, postoperative KUB, no stone seen.     March 4, 2024, BIRTHDAY BOY.  Patient arrives alone.  He still has issues with back pain.  He is scheduled for parathyroid surgery next month.  Urine culture no growth.  Urinalysis is negative for blood.  PSA 1.88.  Renal ultrasound shows 2 mm stones in the right kidney and 3 mm stones in the left kidney.  We will continue to follow.  He will return in 1 year.    March 4, 2025, BIRTHDAY BOY.  Patient " "arrives alone.  He continues on full disability for his back injuries.  He did have parathyroid surgery but his calcium levels have evidently not changed significantly.  He is now complaining of urinary urgency.  He is extremely anxious regarding any procedures.  Urinalysis is negative for blood.  Urine culture no growth.  Urine cytology is atypical, cannot rule out neoplasm.  He has a 40-pack-year cigarette smoking history and continues to smoke 1 pack of cigarettes per day.  I have strongly recommended a cystoscopy with flow studies to evaluate for cytology and urinary urgency.  He may benefit from an alpha agent.  PSA is normal at 1.95.  Renal ultrasound shows \"tiny\" bilateral renal calculi.     PLAN:     #1 in March 2026 with urine studies, PSA, and a renal ultrasound .     #2 patient had a horrible urgency and frequency with his stent. Also distal penile pain. He absolutely REFUSES a URETERAL STENT    3.  Patient will return for cystoscopy, flow studies and a discussion of treatment options     Physical Exam  Vitals and nursing note reviewed.   Constitutional:       Appearance: Normal appearance.   HENT:      Head: Normocephalic and atraumatic.   Pulmonary:      Effort: Pulmonary effort is normal.   Abdominal:      Palpations: Abdomen is soft.   Musculoskeletal:         General: Normal range of motion.      Cervical back: Normal range of motion and neck supple.   Neurological:      Mental Status: He is alert and oriented to person, place, and time. Mental status is at baseline.   Psychiatric:         Thought Content: Thought content normal.        This note was created with voice-recognition software and was not corrected for typographical or grammatical errors.       "

## 2025-03-04 NOTE — PROGRESS NOTES
"     Provider Impressions     65 year-old white male referred by Dr. Perdue for BACK PAIN, HEMATURIA, bilateral KIDNEY STONES. Patient is a  by occupation. He has a positive family history of breast cancer. He has a 40-pack-year cigarette smoking history and now smokes one pack of cigarettes per day.     04/14/18, urinalysis, urine culture and urine cytology are negative. PSA 0.84. Several CAT scans and a CT urogram demonstrated an 8 mm left RENAL CALCULUS and a 4 mm right RENAL CALCULUS. Benefits benefits and risks described. Patient will undergo ESWL of his left RENAL CALCULUS on 05/17/18. The CAT scans have shown CHRONIC PANCREATITIS also. He has seen Dr. Andrea in consultation.     05/17/18, OR, CYSTOSCOPY, left RETROPYELOGRAM, placement of left 6 Swedish by 26 cm double-J URETERAL STENT, ESWL of a 0.7 cm left lower pole STONE.     05/19/18, renal colic CAT scan identifies \"small\" left STONES no sizes were given.     05/23/18, KUB reveals tiny left RENAL STONE remaining.     05/26/18, in office CYSTOSCOPY with REMOVAL of left URETERAL STENT     03/12/19, patient returns with no complaints. Urine culture is normal with no growth. Parathyroid hormone normal at 40.9. PSA normal at 1.4. Urinalysis is negative. Creatinine 1.26. 24-hour urine shows HIGH OXALATE. Renal colic CAT scan does show a 5 mm STONE in the right kidney. URINE CYTOLOGY IS ATYPICAL WITH PAPILLARY CLUSTERS. He does have a significant cigarette smoking history. I offered the patient a cystoscopy under local anesthesia for evaluation of his abnormal cytology. He ABSOLUTELY REFUSES ANY LOCAL PROCEDURES. He also ABSOLUTELY REFUSES ANY URETERAL STENTS. Therefore, he will return in 1 month with an IVP to determine whether to proceed with ESWL. If he does have a large stone, we would perform ESWL, cystoscopy and possible TURBT. If his stone is less than 5 mm, then he will undergo a cystoscopy with possible TURBT in the operating room.   "   04/26/19, patient continues to have no complaints. IVP was performed which identified a 4 mm lower pole right RENAL CALCULUS which we will continue to observe. However with attention to his ABNORMAL CYTOLOGY, the patient is now willing to undergo a cystoscopy in the office under local anesthesia.     05/01/19, cystoscopy today does not reveal any evidence of tumors or stones within the bladder. The patient will return in March of next year for his yearly evaluation.     03/06/20, patient arrives alone. He has no new complaints. Urinalysis and urine culture negative. Urine cytology once again shows atypical cells and cannot rule out urothelial neoplasm. We will proceed with a cystoscopy. This is a second year and a row in this 40-pack-year cigarette smoker and pack-a-day smoker. If the cystoscopy is negative and another abnormal cytology occurs, I will send him to a different urologist for further evaluation. Renal ultrasound shows no stones bilaterally. PSA is normal at 1.00. He will return for cystoscopy     06/16/20, cystoscopy today does not reveal any evidence of stones, lesions or tumors. In light of 2 questionable urine cytologies and a daily cigarette smoker, I will repeat the cytology in 3 months. If it is still suspicious for urothelial neoplasm, I will refer him to Dr. Jenkins for further evaluation and treatment as necessary. He would most probably require ureteroscopy at that point.     09/04/20, patient arrives alone. His repeat urine cytology is negative for malignant cells. We will continue to follow. He will return in March.     February 22, 2021, patient arrives alone. He is extremely anxious. A renal ultrasound showed bilateral 8 mm stones, the one on the right side was in the UPJ. This was confirmed with a CT urogram showing a 6 mm stone just distal to the right UPJ. No left stone was seen. Urine tests were all normal including urinalysis, urine culture and cytology. PSA is normal at 1.50  "creatinine 1.30. Patient has absolutely no pain at this time. I have scheduled him for ESWL of his right proximal ureteral calculus. He absolutely refuses ureteral stents due to his last experience. He has been reading extensively online and wishes to have ureteroscopy with laser lithotripsy performed by Dr. Lay. I have absolutely no problem canceling his surgery and scheduling him with Dr. Lay which we will do. He may choose to have all follow-up with Dr. Lay or return to me next year. Which ever is in his best interest. He is very very anxious about having ESWL again and experiencing discomfort.     March 9, 2021 OR, Dr. Lay, ESWL and right retrograde, ureteroscopy and basket extraction of stones.     October 6, 2021, Dr. Lay, office, follow-up visit.     March 15, 2022, patient arrives alone. He is extremely anxious and worried. Urinalysis is negative for blood. Urine culture no growth. Urine cytology again is atypical cannot rule out neoplasm. I have offered him a cystoscopy in the office. PSA is normal at 1.50. Renal ultrasound shows only a 4 mm stone in the right kidney. His last experience with me and with Dr. Lay with the ureteral stent was \"the worst experience of my life\". He is petrified that the for 4 mm stone will grow and that he will need a procedure with a stent. He is strongly asking for ESWL of a 4 mm stone without a stent and at the same time evaluating his bladder for his abnormal cytology. Before I would proceed, I would like a renal colic CAT scan and KUB with upright. He will return in 4 weeks.     April 11, 2022, patient arrives alone. Renal colic CAT scan identifies a 3 mm stone at the right UPJ. Also sigmoid colon thickening recommending a colonoscopy. We will contact Dr. Ramírez Smallwood for evaluation, gastroenterologist. We will schedule the patient for ESWL of the stone and also possible TURBT due to his abnormal cytology. He is extremely anxious. He is scheduled for May " "12, 2022 and Dr. Jamie Perdue will provide preoperative medical risk stratification.     May 12, 2022, OR, cystoscopy with urethral dilatation, right ureteroscopy, ESWL of a 4 mm right UPJ stone.     May 18, 2023, postoperative KUB, no stone seen.     March 7, 2023, patient arrives alone. He is now on complete disability. He is extremely anxious about all of his medical conditions. He no longer has any flank pain. PSA is normal at 1.54. Urinalysis is negative for blood. Urine culture shows no growth. Urine cytology is lacking atypia. Renal ultrasound shows an 8 mm simple right renal cyst and a \"punctate\" stone in the left renal sinus fat. He will return in 1 year.     April 27, 2023, OR, cystoscopy with urethral dilatation, right retropyelogram, right ureteroscopy with removal of 4 separate 4 mm proximal ureteral calculi     CALCIUM PHOSPHATE stones     May 3, 2023, postoperative KUB, no stone seen.     March 4, 2024, BIRTHDAY BOY.  Patient arrives alone.  He still has issues with back pain.  He is scheduled for parathyroid surgery next month.  Urine culture no growth.  Urinalysis is negative for blood.  PSA 1.88.  Renal ultrasound shows 2 mm stones in the right kidney and 3 mm stones in the left kidney.  We will continue to follow.  He will return in 1 year.    March 4, 2025, BIRTHDAY BOY.  Patient arrives alone.  He continues on full disability for his back injuries.  He did have parathyroid surgery but his calcium levels have evidently not changed significantly.  He is now complaining of urinary urgency.  He is extremely anxious regarding any procedures.  Urinalysis is negative for blood.  Urine culture no growth.  Urine cytology is atypical, cannot rule out neoplasm.  He has a 40-pack-year cigarette smoking history and continues to smoke 1 pack of cigarettes per day.  I have strongly recommended a cystoscopy with flow studies to evaluate for cytology and urinary urgency.  He may benefit from an alpha agent.  " "PSA is normal at 1.95.  Renal ultrasound shows \"tiny\" bilateral renal calculi.     PLAN:     #1 in March 2026 with urine studies, PSA, and a renal ultrasound .     #2 patient had a horrible urgency and frequency with his stent. Also distal penile pain. He absolutely REFUSES a URETERAL STENT    3.  Patient will return for cystoscopy, flow studies and a discussion of treatment options     Physical Exam  Vitals and nursing note reviewed.   Constitutional:       Appearance: Normal appearance.   HENT:      Head: Normocephalic and atraumatic.   Pulmonary:      Effort: Pulmonary effort is normal.   Abdominal:      Palpations: Abdomen is soft.   Musculoskeletal:         General: Normal range of motion.      Cervical back: Normal range of motion and neck supple.   Neurological:      Mental Status: He is alert and oriented to person, place, and time. Mental status is at baseline.   Psychiatric:         Thought Content: Thought content normal.        This note was created with voice-recognition software and was not corrected for typographical or grammatical errors.  "

## 2025-03-04 NOTE — PROGRESS NOTES
Patient has back pain Patient has not had any recent surgeries or hospital admits. Patient denies any concerns about falling or safety. Patient has occasional nocturia and urgency. CV    Review of Systems   Genitourinary:  Positive for enuresis and urgency. Negative for difficulty urinating, hematuria, penile pain, penile swelling, scrotal swelling and testicular pain.   All other systems reviewed and are negative.

## 2025-04-30 ENCOUNTER — APPOINTMENT (OUTPATIENT)
Dept: UROLOGY | Facility: CLINIC | Age: 65
End: 2025-04-30
Payer: COMMERCIAL

## 2025-05-05 ENCOUNTER — APPOINTMENT (OUTPATIENT)
Dept: UROLOGY | Facility: CLINIC | Age: 65
End: 2025-05-05
Payer: COMMERCIAL

## 2025-05-05 VITALS
RESPIRATION RATE: 18 BRPM | BODY MASS INDEX: 25.71 KG/M2 | SYSTOLIC BLOOD PRESSURE: 141 MMHG | HEART RATE: 103 BPM | DIASTOLIC BLOOD PRESSURE: 93 MMHG | WEIGHT: 169.09 LBS

## 2025-05-05 DIAGNOSIS — R31.0 GROSS HEMATURIA: ICD-10-CM

## 2025-05-05 DIAGNOSIS — R31.0 GROSS HEMATURIA: Primary | ICD-10-CM

## 2025-05-05 DIAGNOSIS — R39.15 URGENCY OF URINATION: ICD-10-CM

## 2025-05-05 DIAGNOSIS — N20.0 CALCULUS OF KIDNEY: ICD-10-CM

## 2025-05-05 DIAGNOSIS — R82.89 ABNORMAL URINE CYTOLOGY: ICD-10-CM

## 2025-05-05 DIAGNOSIS — N40.1 BENIGN PROSTATIC HYPERPLASIA WITH LOWER URINARY TRACT SYMPTOMS, SYMPTOM DETAILS UNSPECIFIED: ICD-10-CM

## 2025-05-05 DIAGNOSIS — N20.0 NEPHROLITHIASIS: ICD-10-CM

## 2025-05-05 PROCEDURE — 99214 OFFICE O/P EST MOD 30 MIN: CPT | Performed by: UROLOGY

## 2025-05-05 PROCEDURE — 51798 US URINE CAPACITY MEASURE: CPT | Performed by: UROLOGY

## 2025-05-05 PROCEDURE — 52000 CYSTOURETHROSCOPY: CPT | Performed by: UROLOGY

## 2025-05-05 RX ORDER — POTASSIUM CITRATE 15 MEQ/1
15 TABLET, EXTENDED RELEASE ORAL 3 TIMES DAILY
Qty: 270 TABLET | Refills: 3 | Status: SHIPPED | OUTPATIENT
Start: 2025-05-05 | End: 2026-05-05

## 2025-05-05 RX ORDER — TAMSULOSIN HYDROCHLORIDE 0.4 MG/1
0.4 CAPSULE ORAL DAILY
Qty: 90 CAPSULE | Refills: 3 | Status: SHIPPED | OUTPATIENT
Start: 2025-05-05

## 2025-05-05 NOTE — LETTER
"May 5, 2025     Jamie Waller DO  [address] [facility], [address]  [facility] [address]    Patient: Usman Villarreal   YOB: 1960   Date of Visit: 5/5/2025       Dear Dr. Jamie Waller DO:    Thank you for referring Usman Villarreal to me for evaluation. Below are my notes for this consultation.  If you have questions, please do not hesitate to call me. I look forward to following your patient along with you.       Sincerely,     Khanh Hogan MD      CC: No Recipients  ______________________________________________________________________________________      Provider Impressions     65 year-old white male referred by Dr. Waller for BACK PAIN, HEMATURIA, bilateral KIDNEY STONES. Patient is a  by occupation. He has a positive family history of breast cancer. He has a 40-pack-year cigarette smoking history and now smokes one pack of cigarettes per day.     04/14/18, urinalysis, urine culture and urine cytology are negative. PSA 0.84. Several CAT scans and a CT urogram demonstrated an 8 mm left RENAL CALCULUS and a 4 mm right RENAL CALCULUS. Benefits benefits and risks described. Patient will undergo ESWL of his left RENAL CALCULUS on 05/17/18. The CAT scans have shown CHRONIC PANCREATITIS also. He has seen Dr. Andrea in consultation.     05/17/18, OR, CYSTOSCOPY, left RETROPYELOGRAM, placement of left 6 [place] by 26 cm double-J URETERAL STENT, ESWL of a 0.7 cm left lower pole STONE.     05/19/18, renal colic CAT scan identifies \"small\" left STONES no sizes were given.     05/23/18, KUB reveals tiny left RENAL STONE remaining.     05/26/18, in office CYSTOSCOPY with REMOVAL of left URETERAL STENT     03/12/19, patient returns with no complaints. Urine culture is normal with no growth. Parathyroid hormone normal at 40.9. PSA normal at 1.4. Urinalysis is negative. Creatinine 1.26. 24-hour urine shows HIGH OXALATE. Renal colic CAT scan does show a 5 " mm STONE in the right kidney. URINE CYTOLOGY IS ATYPICAL WITH PAPILLARY CLUSTERS. He does have a significant cigarette smoking history. I offered the patient a cystoscopy under local anesthesia for evaluation of his abnormal cytology. He ABSOLUTELY REFUSES ANY LOCAL PROCEDURES. He also ABSOLUTELY REFUSES ANY URETERAL STENTS. Therefore, he will return in 1 month with an IVP to determine whether to proceed with ESWL. If he does have a large stone, we would perform ESWL, cystoscopy and possible TURBT. If his stone is less than 5 mm, then he will undergo a cystoscopy with possible TURBT in the operating room.     04/26/19, patient continues to have no complaints. IVP was performed which identified a 4 mm lower pole right RENAL CALCULUS which we will continue to observe. However with attention to his ABNORMAL CYTOLOGY, the patient is now willing to undergo a cystoscopy in the office under local anesthesia.     05/01/19, cystoscopy today does not reveal any evidence of tumors or stones within the bladder. The patient will return in March of next year for his yearly evaluation.     03/06/20, patient arrives alone. He has no new complaints. Urinalysis and urine culture negative. Urine cytology once again shows atypical cells and cannot rule out urothelial neoplasm. We will proceed with a cystoscopy. This is a second year and a row in this 40-pack-year cigarette smoker and pack-a-day smoker. If the cystoscopy is negative and another abnormal cytology occurs, I will send him to a different urologist for further evaluation. Renal ultrasound shows no stones bilaterally. PSA is normal at 1.00. He will return for cystoscopy     06/16/20, cystoscopy today does not reveal any evidence of stones, lesions or tumors. In light of 2 questionable urine cytologies and a daily cigarette smoker, I will repeat the cytology in 3 months. If it is still suspicious for urothelial neoplasm, I will refer him to Dr. Jenkins for further evaluation  "and treatment as necessary. He would most probably require ureteroscopy at that point.     09/04/20, patient arrives alone. His repeat urine cytology is negative for malignant cells. We will continue to follow. He will return in March.     February 22, 2021, patient arrives alone. He is extremely anxious. A renal ultrasound showed bilateral 8 mm stones, the one on the right side was in the UPJ. This was confirmed with a CT urogram showing a 6 mm stone just distal to the right UPJ. No left stone was seen. Urine tests were all normal including urinalysis, urine culture and cytology. PSA is normal at 1.50 creatinine 1.30. Patient has absolutely no pain at this time. I have scheduled him for ESWL of his right proximal ureteral calculus. He absolutely refuses ureteral stents due to his last experience. He has been reading extensively online and wishes to have ureteroscopy with laser lithotripsy performed by Dr. Lay. I have absolutely no problem canceling his surgery and scheduling him with Dr. Lay which we will do. He may choose to have all follow-up with Dr. Lay or return to me next year. Which ever is in his best interest. He is very very anxious about having ESWL again and experiencing discomfort.     March 9, 2021 OR, Dr. Lay, ESWL and right retrograde, ureteroscopy and basket extraction of stones.     October 6, 2021, Dr. Lay, office, follow-up visit.     March 15, 2022, patient arrives alone. He is extremely anxious and worried. Urinalysis is negative for blood. Urine culture no growth. Urine cytology again is atypical cannot rule out neoplasm. I have offered him a cystoscopy in the office. PSA is normal at 1.50. Renal ultrasound shows only a 4 mm stone in the right kidney. His last experience with me and with Dr. Lay with the ureteral stent was \"the worst experience of my life\". He is petrified that the for 4 mm stone will grow and that he will need a procedure with a stent. He is strongly asking " "for ESWL of a 4 mm stone without a stent and at the same time evaluating his bladder for his abnormal cytology. Before I would proceed, I would like a renal colic CAT scan and KUB with upright. He will return in 4 weeks.     April 11, 2022, patient arrives alone. Renal colic CAT scan identifies a 3 mm stone at the right UPJ. Also sigmoid colon thickening recommending a colonoscopy. We will contact Dr. Ramírez Smallwood for evaluation, gastroenterologist. We will schedule the patient for ESWL of the stone and also possible TURBT due to his abnormal cytology. He is extremely anxious. He is scheduled for May 12, 2022 and Dr. Jamie Perdue will provide preoperative medical risk stratification.     May 12, 2022, OR, cystoscopy with urethral dilatation, right ureteroscopy, ESWL of a 4 mm right UPJ stone.     May 18, 2023, postoperative KUB, no stone seen.     March 7, 2023, patient arrives alone. He is now on complete disability. He is extremely anxious about all of his medical conditions. He no longer has any flank pain. PSA is normal at 1.54. Urinalysis is negative for blood. Urine culture shows no growth. Urine cytology is lacking atypia. Renal ultrasound shows an 8 mm simple right renal cyst and a \"punctate\" stone in the left renal sinus fat. He will return in 1 year.     April 27, 2023, OR, cystoscopy with urethral dilatation, right retropyelogram, right ureteroscopy with removal of 4 separate 4 mm proximal ureteral calculi     CALCIUM PHOSPHATE stones     May 3, 2023, postoperative KUB, no stone seen.     March 4, 2024, BIRTHDAY BOY.  Patient arrives alone.  He still has issues with back pain.  He is scheduled for parathyroid surgery next month.  Urine culture no growth.  Urinalysis is negative for blood.  PSA 1.88.  Renal ultrasound shows 2 mm stones in the right kidney and 3 mm stones in the left kidney.  We will continue to follow.  He will return in 1 year.     March 4, 2025, BIRTHDAY BOY.  Patient arrives alone.  He " "continues on full disability for his back injuries.  He did have parathyroid surgery but his calcium levels have evidently not changed significantly.  He is now complaining of urinary urgency.  He is extremely anxious regarding any procedures.  Urinalysis is negative for blood.  Urine culture no growth.  Urine cytology is atypical, cannot rule out neoplasm.  He has a 40-pack-year cigarette smoking history and continues to smoke 1 pack of cigarettes per day.  I have strongly recommended a cystoscopy with flow studies to evaluate for cytology and urinary urgency.  He may benefit from an alpha agent.  PSA is normal at 1.95.  Renal ultrasound shows \"tiny\" bilateral renal calculi.    May 5, 2025, cystoscopy today reveals a moderately obstructed prostatic urethra and moderately elevated median lobe.  No evidence of stones, lesions or tumors to account for the atypical cytology cannot rule out neoplasm.  PVR 22 cc.  I will initiate Flomax on a daily basis for his urgency and incomplete emptying.  He wishes us to provide him with his potassium citrate as he no longer sees his nephrologist.  He did have a parathyroidectomy for his elevated calcium with no change in his calcium levels.  We will see him again in March.     PLAN:     #1 in March 2026 with a PVR, urine studies, PSA, and a renal ultrasound .     #2 patient had a horrible urgency and frequency with his stent. Also distal penile pain. He absolutely REFUSES a URETERAL STENT     3.  Flomax 0.4 mg p.o. daily     Physical Exam  Vitals and nursing note reviewed.   Constitutional:       Appearance: Normal appearance.   HENT:      Head: Normocephalic and atraumatic.   Pulmonary:      Effort: Pulmonary effort is normal.   Abdominal:      Palpations: Abdomen is soft.   Musculoskeletal:         General: Normal range of motion.      Cervical back: Normal range of motion and neck supple.   Neurological:      Mental Status: He is alert and oriented to person, place, and time. " "Mental status is at baseline.   Psychiatric:         Thought Content: Thought content normal.        This note was created with voice-recognition software and was not corrected for typographical or grammatical errors.    Patient ID: Nehal Yarbrough is a 65 y.o. male.  Pt denies any pain today. States has not had any recent hospital admits or surgeries since their last visit. Denies any concerns about falling or safety. JML    Procedures  Pt took macrodantin 50mg po as prescribed  Anesthesia: Local 2% Lidocaine  Instruments: 6F flexible disposable cystoscope  Pt brought to procedure room and placed in supine lithotomy position. Pt draped and prepped in normal sterile fashion. 10ml lidocaine instilled into urethral meatus. Pt tolerated well    Pt unable to void for UA today JML     I was present as chaperone for the entirety of the procedure   Pamela Zimmer  Cystoscopy performed by Dr. Khanh Hogan   Bedside \"Time Out\" Verification   Today's Date: 05/05/2025 . I attest that this time out verification took place prior to the procedure.   Procedure: cysto   RN/LPN/MA:IZZY   Provider: WAL.   Verified By: RN/ROBIN/MA,  RK:L and Provider.   Prior to the start of the procedure a time out was taken and the following were verified: the identity of the patient using two patient identifiers, the correct procedure, the correct site marked as indicated, the correct positioning for the patient and the correct equipment was obtained.   Cystoscopy - female  MALE NEHAL YARBROUGH  identified using two (2) forms of identification.   Procedure: diagnostic cystourethroscopy.  Procedure Note: Time Started:  Time Completed: STARTED: 3:00PM  COMPLETED: 3:22 PM  Indications for procedure: irritable voiding symptoms.   Discussed with patient: Risks, benefits, and alternative were discussed in detail. Patient appears to understand and agrees to proceed. Patient has signed the procedure consent form.    CYSTOSCOPY:    Cystoscopy today reveals a " normal distal urethra and external sphincter.  Moderately obstructed prostatic urethra and moderately elevated median lobe.  No evidence of stones, lesions or tumors within the bladder.  PVR 22 cc.

## 2025-05-05 NOTE — PROGRESS NOTES
"  Provider Impressions     65 year-old white male referred by Dr. Perdue for BACK PAIN, HEMATURIA, bilateral KIDNEY STONES. Patient is a  by occupation. He has a positive family history of breast cancer. He has a 40-pack-year cigarette smoking history and now smokes one pack of cigarettes per day.     04/14/18, urinalysis, urine culture and urine cytology are negative. PSA 0.84. Several CAT scans and a CT urogram demonstrated an 8 mm left RENAL CALCULUS and a 4 mm right RENAL CALCULUS. Benefits benefits and risks described. Patient will undergo ESWL of his left RENAL CALCULUS on 05/17/18. The CAT scans have shown CHRONIC PANCREATITIS also. He has seen Dr. Andrea in consultation.     05/17/18, OR, CYSTOSCOPY, left RETROPYELOGRAM, placement of left 6 [place] by 26 cm double-J URETERAL STENT, ESWL of a 0.7 cm left lower pole STONE.     05/19/18, renal colic CAT scan identifies \"small\" left STONES no sizes were given.     05/23/18, KUB reveals tiny left RENAL STONE remaining.     05/26/18, in office CYSTOSCOPY with REMOVAL of left URETERAL STENT     03/12/19, patient returns with no complaints. Urine culture is normal with no growth. Parathyroid hormone normal at 40.9. PSA normal at 1.4. Urinalysis is negative. Creatinine 1.26. 24-hour urine shows HIGH OXALATE. Renal colic CAT scan does show a 5 mm STONE in the right kidney. URINE CYTOLOGY IS ATYPICAL WITH PAPILLARY CLUSTERS. He does have a significant cigarette smoking history. I offered the patient a cystoscopy under local anesthesia for evaluation of his abnormal cytology. He ABSOLUTELY REFUSES ANY LOCAL PROCEDURES. He also ABSOLUTELY REFUSES ANY URETERAL STENTS. Therefore, he will return in 1 month with an IVP to determine whether to proceed with ESWL. If he does have a large stone, we would perform ESWL, cystoscopy and possible TURBT. If his stone is less than 5 mm, then he will undergo a cystoscopy with possible TURBT in the operating room.   "   04/26/19, patient continues to have no complaints. IVP was performed which identified a 4 mm lower pole right RENAL CALCULUS which we will continue to observe. However with attention to his ABNORMAL CYTOLOGY, the patient is now willing to undergo a cystoscopy in the office under local anesthesia.     05/01/19, cystoscopy today does not reveal any evidence of tumors or stones within the bladder. The patient will return in March of next year for his yearly evaluation.     03/06/20, patient arrives alone. He has no new complaints. Urinalysis and urine culture negative. Urine cytology once again shows atypical cells and cannot rule out urothelial neoplasm. We will proceed with a cystoscopy. This is a second year and a row in this 40-pack-year cigarette smoker and pack-a-day smoker. If the cystoscopy is negative and another abnormal cytology occurs, I will send him to a different urologist for further evaluation. Renal ultrasound shows no stones bilaterally. PSA is normal at 1.00. He will return for cystoscopy     06/16/20, cystoscopy today does not reveal any evidence of stones, lesions or tumors. In light of 2 questionable urine cytologies and a daily cigarette smoker, I will repeat the cytology in 3 months. If it is still suspicious for urothelial neoplasm, I will refer him to Dr. Jenkins for further evaluation and treatment as necessary. He would most probably require ureteroscopy at that point.     09/04/20, patient arrives alone. His repeat urine cytology is negative for malignant cells. We will continue to follow. He will return in March.     February 22, 2021, patient arrives alone. He is extremely anxious. A renal ultrasound showed bilateral 8 mm stones, the one on the right side was in the UPJ. This was confirmed with a CT urogram showing a 6 mm stone just distal to the right UPJ. No left stone was seen. Urine tests were all normal including urinalysis, urine culture and cytology. PSA is normal at 1.50  "creatinine 1.30. Patient has absolutely no pain at this time. I have scheduled him for ESWL of his right proximal ureteral calculus. He absolutely refuses ureteral stents due to his last experience. He has been reading extensively online and wishes to have ureteroscopy with laser lithotripsy performed by Dr. Lay. I have absolutely no problem canceling his surgery and scheduling him with Dr. Lay which we will do. He may choose to have all follow-up with Dr. Lay or return to me next year. Which ever is in his best interest. He is very very anxious about having ESWL again and experiencing discomfort.     March 9, 2021 OR, Dr. Lay, ESWL and right retrograde, ureteroscopy and basket extraction of stones.     October 6, 2021, Dr. Lay, office, follow-up visit.     March 15, 2022, patient arrives alone. He is extremely anxious and worried. Urinalysis is negative for blood. Urine culture no growth. Urine cytology again is atypical cannot rule out neoplasm. I have offered him a cystoscopy in the office. PSA is normal at 1.50. Renal ultrasound shows only a 4 mm stone in the right kidney. His last experience with me and with Dr. Lay with the ureteral stent was \"the worst experience of my life\". He is petrified that the for 4 mm stone will grow and that he will need a procedure with a stent. He is strongly asking for ESWL of a 4 mm stone without a stent and at the same time evaluating his bladder for his abnormal cytology. Before I would proceed, I would like a renal colic CAT scan and KUB with upright. He will return in 4 weeks.     April 11, 2022, patient arrives alone. Renal colic CAT scan identifies a 3 mm stone at the right UPJ. Also sigmoid colon thickening recommending a colonoscopy. We will contact Dr. Ramírez Smallwood for evaluation, gastroenterologist. We will schedule the patient for ESWL of the stone and also possible TURBT due to his abnormal cytology. He is extremely anxious. He is scheduled for May " "12, 2022 and Dr. Jamie Perdue will provide preoperative medical risk stratification.     May 12, 2022, OR, cystoscopy with urethral dilatation, right ureteroscopy, ESWL of a 4 mm right UPJ stone.     May 18, 2023, postoperative KUB, no stone seen.     March 7, 2023, patient arrives alone. He is now on complete disability. He is extremely anxious about all of his medical conditions. He no longer has any flank pain. PSA is normal at 1.54. Urinalysis is negative for blood. Urine culture shows no growth. Urine cytology is lacking atypia. Renal ultrasound shows an 8 mm simple right renal cyst and a \"punctate\" stone in the left renal sinus fat. He will return in 1 year.     April 27, 2023, OR, cystoscopy with urethral dilatation, right retropyelogram, right ureteroscopy with removal of 4 separate 4 mm proximal ureteral calculi     CALCIUM PHOSPHATE stones     May 3, 2023, postoperative KUB, no stone seen.     March 4, 2024, BIRTHDAY BOY.  Patient arrives alone.  He still has issues with back pain.  He is scheduled for parathyroid surgery next month.  Urine culture no growth.  Urinalysis is negative for blood.  PSA 1.88.  Renal ultrasound shows 2 mm stones in the right kidney and 3 mm stones in the left kidney.  We will continue to follow.  He will return in 1 year.     March 4, 2025, BIRTHDAY BOY.  Patient arrives alone.  He continues on full disability for his back injuries.  He did have parathyroid surgery but his calcium levels have evidently not changed significantly.  He is now complaining of urinary urgency.  He is extremely anxious regarding any procedures.  Urinalysis is negative for blood.  Urine culture no growth.  Urine cytology is atypical, cannot rule out neoplasm.  He has a 40-pack-year cigarette smoking history and continues to smoke 1 pack of cigarettes per day.  I have strongly recommended a cystoscopy with flow studies to evaluate for cytology and urinary urgency.  He may benefit from an alpha agent.  " "PSA is normal at 1.95.  Renal ultrasound shows \"tiny\" bilateral renal calculi.    May 5, 2025, cystoscopy today reveals a moderately obstructed prostatic urethra and moderately elevated median lobe.  No evidence of stones, lesions or tumors to account for the atypical cytology cannot rule out neoplasm.  PVR 22 cc.  I will initiate Flomax on a daily basis for his urgency and incomplete emptying.  He wishes us to provide him with his potassium citrate as he no longer sees his nephrologist.  He did have a parathyroidectomy for his elevated calcium with no change in his calcium levels.  We will see him again in March.     PLAN:     #1 in March 2026 with a PVR, urine studies, PSA, and a renal ultrasound .     #2 patient had a horrible urgency and frequency with his stent. Also distal penile pain. He absolutely REFUSES a URETERAL STENT     3.  Flomax 0.4 mg p.o. daily     Physical Exam  Vitals and nursing note reviewed.   Constitutional:       Appearance: Normal appearance.   HENT:      Head: Normocephalic and atraumatic.   Pulmonary:      Effort: Pulmonary effort is normal.   Abdominal:      Palpations: Abdomen is soft.   Musculoskeletal:         General: Normal range of motion.      Cervical back: Normal range of motion and neck supple.   Neurological:      Mental Status: He is alert and oriented to person, place, and time. Mental status is at baseline.   Psychiatric:         Thought Content: Thought content normal.        This note was created with voice-recognition software and was not corrected for typographical or grammatical errors.  "

## 2025-05-05 NOTE — PROGRESS NOTES
"Patient ID: Nehal Yarbrough is a 65 y.o. male.  Pt denies any pain today. States has not had any recent hospital admits or surgeries since their last visit. Denies any concerns about falling or safety. JML    Procedures  Pt took macrodantin 50mg po as prescribed  Anesthesia: Local 2% Lidocaine  Instruments: 6F flexible disposable cystoscope  Pt brought to procedure room and placed in supine lithotomy position. Pt draped and prepped in normal sterile fashion. 10ml lidocaine instilled into urethral meatus. Pt tolerated well    Pt unable to void for UA today JML     I was present as chaperone for the entirety of the procedure   Pamela Zimmer  Cystoscopy performed by Dr. Khanh Hogan   Bedside \"Time Out\" Verification   Today's Date: 05/05/2025 . I attest that this time out verification took place prior to the procedure.   Procedure: cysto   RN/LPN/MA:IZZY   Provider: WAL.   Verified By: RN/ROBIN/MA,  RK:L and Provider.   Prior to the start of the procedure a time out was taken and the following were verified: the identity of the patient using two patient identifiers, the correct procedure, the correct site marked as indicated, the correct positioning for the patient and the correct equipment was obtained.   Cystoscopy - female  MALE NEHAL YARBROUGH  identified using two (2) forms of identification.   Procedure: diagnostic cystourethroscopy.  Procedure Note: Time Started:  Time Completed: STARTED: 3:00PM  COMPLETED: 3:22 PM  Indications for procedure: irritable voiding symptoms.   Discussed with patient: Risks, benefits, and alternative were discussed in detail. Patient appears to understand and agrees to proceed. Patient has signed the procedure consent form.    CYSTOSCOPY:    Cystoscopy today reveals a normal distal urethra and external sphincter.  Moderately obstructed prostatic urethra and moderately elevated median lobe.  No evidence of stones, lesions or tumors within the bladder.  PVR 22 cc.    "

## 2025-05-05 NOTE — PATIENT INSTRUCTIONS
Patient Discussion/Summary     It was nice to see you once again. Your urine cytology is atypical and they cannot rule out a possible polyp or tumor.  In addition, you are experiencing urinary urgency.  For, today you completed a cystoscopy with flow studies for further evaluation.  We did not find any evidence of stones, lesions or tumors within the bladder.  You do have an obstructed prostate and I will begin Flomax.  I will see you again in March.      This note was created with voice-recognition software and was not corrected for typographical or grammatical errors.

## 2025-06-13 ENCOUNTER — TELEMEDICINE (OUTPATIENT)
Dept: PRIMARY CARE | Facility: CLINIC | Age: 65
End: 2025-06-13
Payer: COMMERCIAL

## 2025-06-13 DIAGNOSIS — H00.019 HORDEOLUM EXTERNUM, UNSPECIFIED LATERALITY: ICD-10-CM

## 2025-06-13 DIAGNOSIS — A08.4 VIRAL GASTROENTERITIS: ICD-10-CM

## 2025-06-13 DIAGNOSIS — J01.00 ACUTE MAXILLARY SINUSITIS, RECURRENCE NOT SPECIFIED: Primary | ICD-10-CM

## 2025-06-13 PROCEDURE — 99214 OFFICE O/P EST MOD 30 MIN: CPT | Performed by: FAMILY MEDICINE

## 2025-06-13 RX ORDER — SULFAMETHOXAZOLE AND TRIMETHOPRIM 800; 160 MG/1; MG/1
1 TABLET ORAL 2 TIMES DAILY
Qty: 20 TABLET | Refills: 0 | Status: SHIPPED | OUTPATIENT
Start: 2025-06-13 | End: 2025-06-23

## 2025-06-13 NOTE — PATIENT INSTRUCTIONS
Acute maxillary sinusitis with viral gastroenteritis.  With bilateral styes.  Has been having symptoms for about 2 weeks, having a lot of periorbital/infraorbital sinus pain and pressure.  Also diarrhea with significant nausea.  Has been using nasal saline rinse.  Has previously tried different nasal steroids and in general those do not seem to help. -->>  Will prescribe Bactrim for 10 days.  Could also consider trying over-the-counter phenylephrine decongestant pills. Note these can raise blood pressure and may be cause some insomnia.      Return next month as already scheduled.

## 2025-06-13 NOTE — PROGRESS NOTES
Subjective   Patient ID: Usman Villarreal is a 65 y.o. male who presents for Virtual Visit (Pt being seen virtually for sick visit).    Review of Systems  Denies N/V/D/C, no HA/S/V, denies rashes/lesions, no CP/SOB. Denies fevers/chills.  Positive for back pain.  All other systems were negative.    Objective   Physical Exam  Gen: NAD  eyes: EOMI, PERRLA  ENT: hearing grossly intact, no nasal discharge  resp: CTABL, without R/R  heart: RRR without MRG  GI: abd: S/ND/NT, BS+  lymph: no axillary, cervical, supraclavicular lymphadenopathy noted   MS: gait grossly WNL,  derm: no rashes or lesions noted  neuro: CN II-XII grossly intact  psych: A&Ox3    Assessment/Plan   Diagnoses and all orders for this visit:  Acute maxillary sinusitis, recurrence not specified  -     sulfamethoxazole-trimethoprim (Bactrim DS) 800-160 mg tablet; Take 1 tablet by mouth 2 times a day for 10 days. Take with food.  Hordeolum externum, unspecified laterality  Viral gastroenteritis        Virtual or Telephone Consent    An interactive audio and video telecommunication system which permits real time communications between the patient (at the originating site) and provider (at the distant site) was utilized to provide this telehealth service.   Verbal consent was requested and obtained from Usman Villarreal on this date, 06/13/25 for a telehealth visit and the patient's location was confirmed at the time of the visit.      Patient Discussion/Summary       Acute maxillary sinusitis with viral gastroenteritis.  With bilateral styes.  Has been having symptoms for about 2 weeks, having a lot of periorbital/infraorbital sinus pain and pressure.  Also diarrhea with significant nausea.  Has been using nasal saline rinse.  Has previously tried different nasal steroids and in general those do not seem to help. -->>  Will prescribe Bactrim for 10 days.  Could also consider trying over-the-counter phenylephrine decongestant pills. Note these can raise blood  pressure and may be cause some insomnia.      Return next month as already scheduled.

## 2025-07-09 LAB
25(OH)D3+25(OH)D2 SERPL-MCNC: 44 NG/ML (ref 30–100)
ALBUMIN SERPL-MCNC: 4.9 G/DL (ref 3.6–5.1)
ALP SERPL-CCNC: 33 U/L (ref 35–144)
ALT SERPL-CCNC: 15 U/L (ref 9–46)
ANION GAP SERPL CALCULATED.4IONS-SCNC: 8 MMOL/L (CALC) (ref 7–17)
APPEARANCE UR: CLEAR
AST SERPL-CCNC: 18 U/L (ref 10–35)
BASOPHILS # BLD AUTO: 58 CELLS/UL (ref 0–200)
BASOPHILS NFR BLD AUTO: 1 %
BILIRUB SERPL-MCNC: 0.6 MG/DL (ref 0.2–1.2)
BILIRUB UR QL STRIP: NEGATIVE
BUN SERPL-MCNC: 16 MG/DL (ref 7–25)
CALCIUM SERPL-MCNC: 10.7 MG/DL (ref 8.6–10.3)
CHLORIDE SERPL-SCNC: 104 MMOL/L (ref 98–110)
CHOLEST SERPL-MCNC: 175 MG/DL
CHOLEST/HDLC SERPL: 4.4 (CALC)
CO2 SERPL-SCNC: 27 MMOL/L (ref 20–32)
COLOR UR: YELLOW
CREAT SERPL-MCNC: 1.12 MG/DL (ref 0.7–1.35)
EGFRCR SERPLBLD CKD-EPI 2021: 73 ML/MIN/1.73M2
EOSINOPHIL # BLD AUTO: 423 CELLS/UL (ref 15–500)
EOSINOPHIL NFR BLD AUTO: 7.3 %
ERYTHROCYTE [DISTWIDTH] IN BLOOD BY AUTOMATED COUNT: 13.4 % (ref 11–15)
EST. AVERAGE GLUCOSE BLD GHB EST-MCNC: 114 MG/DL
EST. AVERAGE GLUCOSE BLD GHB EST-SCNC: 6.3 MMOL/L
GLUCOSE SERPL-MCNC: 101 MG/DL (ref 65–99)
GLUCOSE UR QL STRIP: NEGATIVE
HBA1C MFR BLD: 5.6 %
HCT VFR BLD AUTO: 47.6 % (ref 38.5–50)
HDLC SERPL-MCNC: 40 MG/DL
HGB BLD-MCNC: 15.8 G/DL (ref 13.2–17.1)
HGB UR QL STRIP: NEGATIVE
KETONES UR QL STRIP: NEGATIVE
LDLC SERPL CALC-MCNC: 101 MG/DL (CALC)
LEUKOCYTE ESTERASE UR QL STRIP: NEGATIVE
LYMPHOCYTES # BLD AUTO: 2146 CELLS/UL (ref 850–3900)
LYMPHOCYTES NFR BLD AUTO: 37 %
MAGNESIUM SERPL-MCNC: 2 MG/DL (ref 1.5–2.5)
MCH RBC QN AUTO: 31.9 PG (ref 27–33)
MCHC RBC AUTO-ENTMCNC: 33.2 G/DL (ref 32–36)
MCV RBC AUTO: 96.2 FL (ref 80–100)
MONOCYTES # BLD AUTO: 824 CELLS/UL (ref 200–950)
MONOCYTES NFR BLD AUTO: 14.2 %
NEUTROPHILS # BLD AUTO: 2349 CELLS/UL (ref 1500–7800)
NEUTROPHILS NFR BLD AUTO: 40.5 %
NITRITE UR QL STRIP: NEGATIVE
NONHDLC SERPL-MCNC: 135 MG/DL (CALC)
PH UR STRIP: 8.5 [PH] (ref 5–8)
PLATELET # BLD AUTO: 292 THOUSAND/UL (ref 140–400)
PMV BLD REES-ECKER: 8.8 FL (ref 7.5–12.5)
POTASSIUM SERPL-SCNC: 5 MMOL/L (ref 3.5–5.3)
PROT SERPL-MCNC: 7.6 G/DL (ref 6.1–8.1)
PROT UR QL STRIP: NEGATIVE
RBC # BLD AUTO: 4.95 MILLION/UL (ref 4.2–5.8)
SODIUM SERPL-SCNC: 139 MMOL/L (ref 135–146)
SP GR UR STRIP: 1.01 (ref 1–1.03)
TRIGL SERPL-MCNC: 223 MG/DL
TSH SERPL-ACNC: 0.66 MIU/L (ref 0.4–4.5)
WBC # BLD AUTO: 5.8 THOUSAND/UL (ref 3.8–10.8)

## 2025-07-16 ENCOUNTER — APPOINTMENT (OUTPATIENT)
Dept: PRIMARY CARE | Facility: CLINIC | Age: 65
End: 2025-07-16
Payer: COMMERCIAL

## 2025-07-16 DIAGNOSIS — E78.5 HYPERLIPIDEMIA, UNSPECIFIED HYPERLIPIDEMIA TYPE: ICD-10-CM

## 2025-07-16 RX ORDER — ICOSAPENT ETHYL 1 G/1
CAPSULE ORAL
Qty: 360 CAPSULE | Refills: 3 | Status: SHIPPED | OUTPATIENT
Start: 2025-07-16

## 2025-08-07 ENCOUNTER — TRANSCRIBE ORDERS (OUTPATIENT)
Dept: ADMINISTRATIVE | Age: 65
End: 2025-08-07

## 2025-08-07 DIAGNOSIS — M81.0 AGE-RELATED OSTEOPOROSIS WITHOUT CURRENT PATHOLOGICAL FRACTURE: Primary | ICD-10-CM

## 2025-08-15 ENCOUNTER — HOSPITAL ENCOUNTER (OUTPATIENT)
Dept: WOMENS IMAGING | Age: 65
Discharge: HOME | End: 2025-08-17
Payer: COMMERCIAL

## 2025-08-15 DIAGNOSIS — M81.0 AGE-RELATED OSTEOPOROSIS WITHOUT CURRENT PATHOLOGICAL FRACTURE: ICD-10-CM

## 2025-08-15 PROCEDURE — 77080 DXA BONE DENSITY AXIAL: CPT

## 2025-09-02 ENCOUNTER — APPOINTMENT (OUTPATIENT)
Dept: PRIMARY CARE | Facility: CLINIC | Age: 65
End: 2025-09-02
Payer: COMMERCIAL

## 2025-09-02 PROBLEM — S22.069A: Status: ACTIVE | Noted: 2025-09-02

## 2025-09-02 ASSESSMENT — PATIENT HEALTH QUESTIONNAIRE - PHQ9
1. LITTLE INTEREST OR PLEASURE IN DOING THINGS: NOT AT ALL
SUM OF ALL RESPONSES TO PHQ9 QUESTIONS 1 AND 2: 0
2. FEELING DOWN, DEPRESSED OR HOPELESS: NOT AT ALL

## 2025-09-02 ASSESSMENT — ENCOUNTER SYMPTOMS
LOSS OF SENSATION IN FEET: 0
OCCASIONAL FEELINGS OF UNSTEADINESS: 0
DEPRESSION: 0

## 2026-03-23 ENCOUNTER — APPOINTMENT (OUTPATIENT)
Dept: UROLOGY | Facility: CLINIC | Age: 66
End: 2026-03-23
Payer: COMMERCIAL

## 2026-09-03 ENCOUNTER — APPOINTMENT (OUTPATIENT)
Dept: PRIMARY CARE | Facility: CLINIC | Age: 66
End: 2026-09-03
Payer: COMMERCIAL